# Patient Record
Sex: MALE | Race: BLACK OR AFRICAN AMERICAN | NOT HISPANIC OR LATINO | Employment: UNEMPLOYED | URBAN - METROPOLITAN AREA
[De-identification: names, ages, dates, MRNs, and addresses within clinical notes are randomized per-mention and may not be internally consistent; named-entity substitution may affect disease eponyms.]

---

## 2020-02-12 ENCOUNTER — HISTORICAL (OUTPATIENT)
Dept: LAB | Facility: HOSPITAL | Age: 35
End: 2020-02-12

## 2020-02-12 LAB
ABS NEUT (OLG): 3.1 X10(3)/MCL (ref 1.5–6.9)
ANISOCYTOSIS BLD QL SMEAR: ABNORMAL
BASOPHILS NFR BLD MANUAL: 0 % (ref 0–1)
EOSINOPHIL NFR BLD MANUAL: 2 % (ref 0–5)
ERYTHROCYTE [DISTWIDTH] IN BLOOD BY AUTOMATED COUNT: 15 % (ref 11.5–17)
GRANULOCYTES NFR BLD MANUAL: 41 % (ref 47–80)
HCT VFR BLD AUTO: 34.6 % (ref 42–52)
HGB BLD-MCNC: 12.9 GM/DL (ref 14–18)
LYMPHOCYTES NFR BLD MANUAL: 47 % (ref 15–40)
MACROCYTES BLD QL SMEAR: ABNORMAL
MCH RBC QN AUTO: 49 PG (ref 27–34)
MCHC RBC AUTO-ENTMCNC: 37 GM/DL (ref 31–36)
MCV RBC AUTO: 132 FL (ref 80–99)
MONOCYTES NFR BLD MANUAL: 10 % (ref 4–12)
NRBC BLD MANUAL-RTO: 76 %
PLATELET # BLD AUTO: 376 X10(3)/MCL (ref 140–400)
PLATELET # BLD EST: ADEQUATE 10*3/UL
PMV BLD AUTO: 10.7 FL (ref 6.8–10)
POIKILOCYTOSIS BLD QL SMEAR: ABNORMAL
POLYCHROMASIA BLD QL SMEAR: ABNORMAL
RBC # BLD AUTO: 2.62 X10(6)/MCL (ref 4.7–6.1)
RBC MORPH BLD: ABNORMAL
SCHISTOCYTES BLD QL AUTO: ABNORMAL
WBC # SPEC AUTO: 11.6 X10(3)/MCL (ref 4.5–11.5)

## 2022-04-26 ENCOUNTER — HISTORICAL (OUTPATIENT)
Dept: LAB | Facility: HOSPITAL | Age: 37
End: 2022-04-26
Payer: MEDICARE

## 2022-04-26 LAB
ABS NEUT (OLG): 3 (ref 1.5–6.9)
ALBUMIN SERPL-MCNC: 3.7 G/DL (ref 3.5–5)
ALBUMIN/GLOB SERPL: 0.9 {RATIO} (ref 1.1–2)
ALP SERPL-CCNC: 61 U/L (ref 40–150)
ALT SERPL-CCNC: 31 U/L (ref 0–55)
AST SERPL-CCNC: 41 U/L (ref 5–34)
BILIRUB SERPL-MCNC: 2.1 MG/DL
BILIRUBIN DIRECT+TOT PNL SERPL-MCNC: 0.6 (ref 0–0.5)
BILIRUBIN DIRECT+TOT PNL SERPL-MCNC: 1.5 (ref 0–0.8)
BUN SERPL-MCNC: 14 MG/DL (ref 8.9–20.6)
CALCIUM SERPL-MCNC: 9.7 MG/DL (ref 8.7–10.5)
CHLORIDE SERPL-SCNC: 109 MMOL/L (ref 98–107)
CO2 SERPL-SCNC: 26 MMOL/L (ref 22–29)
CREAT SERPL-MCNC: 0.86 MG/DL (ref 0.73–1.18)
DEPRECATED CALCIDIOL+CALCIFEROL SERPL-MC: 23.8 NG/ML (ref 30–80)
ERYTHROCYTE [DISTWIDTH] IN BLOOD BY AUTOMATED COUNT: 14.5 % (ref 11.5–17)
FERRITIN SERPL-MCNC: 6687.11 NG/ML (ref 21.81–274.66)
GLOBULIN SER-MCNC: 4.3 G/DL (ref 2.4–3.5)
GLUCOSE SERPL-MCNC: 94 MG/DL (ref 74–100)
HCT VFR BLD AUTO: 31.3 % (ref 42–52)
HEMOLYSIS INTERF INDEX SERPL-ACNC: >10
HGB BLD-MCNC: 11.6 G/DL (ref 14–18)
ICTERIC INTERF INDEX SERPL-ACNC: 1
LIPEMIC INTERF INDEX SERPL-ACNC: >10
MANUAL DIFF? (OHS): YES
MCH RBC QN AUTO: 45 PG (ref 27–34)
MCHC RBC AUTO-ENTMCNC: 37 G/DL (ref 31–36)
MCV RBC AUTO: 122 FL (ref 80–99)
PLATELET # BLD AUTO: 335 10*3/UL (ref 140–400)
PMV BLD AUTO: 10 FL (ref 6.8–10)
POTASSIUM SERPL-SCNC: 4.7 MMOL/L (ref 3.5–5.1)
PROT SERPL-MCNC: 8 G/DL (ref 6.4–8.3)
RBC # BLD AUTO: 2.56 10*6/UL (ref 4.7–6.1)
SODIUM SERPL-SCNC: 140 MMOL/L (ref 136–145)
WBC # SPEC AUTO: 9.2 10*3/UL (ref 4.5–11.5)

## 2022-05-10 RX ORDER — OXYCODONE AND ACETAMINOPHEN 10; 325 MG/1; MG/1
1 TABLET ORAL 2 TIMES DAILY
Status: CANCELLED | OUTPATIENT
Start: 2022-05-10

## 2022-05-10 RX ORDER — OXYCODONE AND ACETAMINOPHEN 10; 325 MG/1; MG/1
1 TABLET ORAL 2 TIMES DAILY
Qty: 60 TABLET | Refills: 0 | Status: SHIPPED | OUTPATIENT
Start: 2022-05-10 | End: 2022-06-10

## 2022-05-10 RX ORDER — OXYCODONE AND ACETAMINOPHEN 10; 325 MG/1; MG/1
1 TABLET ORAL 2 TIMES DAILY
COMMUNITY
Start: 2022-03-09 | End: 2022-05-10 | Stop reason: SDUPTHER

## 2022-05-12 ENCOUNTER — DOCUMENTATION ONLY (OUTPATIENT)
Dept: HEMATOLOGY/ONCOLOGY | Facility: CLINIC | Age: 37
End: 2022-05-12
Payer: MEDICARE

## 2022-05-12 NOTE — PROGRESS NOTES
Patient called but no answer and unable to leave a voice message because it wasn't set up yet. Patient was called to see if I still need to set him up with a PCP.

## 2022-05-24 ENCOUNTER — OFFICE VISIT (OUTPATIENT)
Dept: HEMATOLOGY/ONCOLOGY | Facility: CLINIC | Age: 37
End: 2022-05-24
Payer: MEDICARE

## 2022-05-24 DIAGNOSIS — D57.00 SICKLE CELL DISEASE WITH CRISIS: Primary | ICD-10-CM

## 2022-05-24 PROCEDURE — 99442 PR PHYSICIAN TELEPHONE EVALUATION 11-20 MIN: ICD-10-PCS | Mod: 95,,,

## 2022-05-24 PROCEDURE — 99442 PR PHYSICIAN TELEPHONE EVALUATION 11-20 MIN: CPT | Mod: 95,,,

## 2022-05-24 RX ORDER — LOSARTAN POTASSIUM 50 MG/1
50 TABLET ORAL DAILY
COMMUNITY

## 2022-05-24 RX ORDER — FOLIC ACID 1 MG/1
1 TABLET ORAL DAILY
COMMUNITY

## 2022-05-24 RX ORDER — TRAMADOL HYDROCHLORIDE 50 MG/1
50 TABLET ORAL EVERY 12 HOURS PRN
COMMUNITY
End: 2023-05-04

## 2022-05-24 RX ORDER — METOPROLOL SUCCINATE 50 MG/1
50 TABLET, EXTENDED RELEASE ORAL DAILY
COMMUNITY

## 2022-05-24 RX ORDER — LORATADINE 10 MG/1
10 TABLET ORAL DAILY
COMMUNITY

## 2022-05-24 RX ORDER — HYDROXYUREA 500 MG/1
500 CAPSULE ORAL 2 TIMES DAILY
COMMUNITY
End: 2023-01-03

## 2022-05-24 RX ORDER — SPIRONOLACTONE 50 MG/1
50 TABLET, FILM COATED ORAL DAILY
COMMUNITY

## 2022-05-24 RX ORDER — TAMSULOSIN HYDROCHLORIDE 0.4 MG/1
1 CAPSULE ORAL NIGHTLY
COMMUNITY

## 2022-05-24 RX ORDER — DEFERIPRONE 1000 MG/1
2 TABLET ORAL 3 TIMES DAILY
COMMUNITY
End: 2022-09-28 | Stop reason: SDUPTHER

## 2022-05-24 RX ORDER — HYDROCHLOROTHIAZIDE 12.5 MG/1
12.5 CAPSULE ORAL
COMMUNITY
End: 2022-11-02

## 2022-05-24 NOTE — PROGRESS NOTES
Established Patient - Audio Only Telehealth Visit     The patient location is: home  The chief complaint leading to consultation is:   Visit type: Virtual visit with audio only (telephone)  Total time spent with patient: 12 minutes    PCP: Jarett Paulino MD   Ortho: Dr. Mcbride ([place])   Renal: Dr. Lira ([place])        The reason for the audio only service rather than synchronous audio and video virtual visit was related to technical difficulties or patient preference/necessity.     Each patient to whom I provide medical services by telemedicine is:  (1) informed of the relationship between the physician and patient and the respective role of any other health care provider with respect to management of the patient; and (2) notified that they may decline to receive medical services by telemedicine and may withdraw from such care at any time. Patient verbally consented to receive this service via voice-only telephone call.       HPI:    Diagnosis/Treatment History:   Sickle Cell Disease   - Hydrea 500mg po BID   - ECHO 5/6/16   - Ophthalmology 1/31/18   Iron overload 2/2 transfusions   - Rash with exjade   - Ferriprox - started mid 7/2016   Vitamin D Deficiency     Interval History  Mr. Kumar is seen today for follow up for sickle cell disease, visit via telemedicine. Reinforced importance for him to continue compliance with his medications as Ferritin levels have greatly improved.  He verbalized understanding.   No fever, chills, sweats. No chest pain or shortness of breath. No recent hospital, ER admission or blood transfusion.    Radiology results   Rad Results Last 10 Days   MRI of the abdomen without contrast 2/3/2022:   Liver is borderline prominent with right lobe measuring 16.2 cm. Liver remains of low signal intensity on all imaging sequences consistent with hemochromatosis. Slightly higher signal in the out of phase images relative to the in phase sequence, again compatible with hemochromatosis.  The spleen is small and calcified. Pancreas unremarkable. Bilateral renal cysts with dominant 2.9 cm right interpolar cyst. Impression: Liver MRI findings compatible with hemochromatosis. Borderline liver size.     Labs  22- WBC 9.10, Hgb 9.6, .1, Plt 297, Cr 0.93, AST 37, ALT 37, Ferritin 4457    Assessment and plan:       1. Sickle Cell Disease - Cont Hydrea 1000mg BID. Cont folic acid.   EYE EXAM - He did see Dr. Anna Raza, OD in [place] on 2020. Eye exam due - pt states it is scheduled for 21.   Echo done 21, no evidence of pulm htn.   2. Iron overload - Cont Ferriprox 2500mg TID - encouraged better compliance. If improved compliance does not improve ferritin, will need to consider alternate chelator - MRI of liver showed iron deposition 2022. Has not taken Ferripox is some time due to joint pain. His percocet prescription has  due to his PCP in rehab following a CVA. Suggested trial of Jadenu (Child[place] class A) to hopefully build better compliance with iron chelation therapy but he declines at this time.   Labs reviewed Ferritin level improved since he has better daily compliance with Ferriprox. Ferritin decreased from 6687 to 4457  Minimize blood transfusions as able - recommend transfusing 1 unit only for symptomatic anemia and then reassessing prior to giving more blood. In addition to continued risk of iron overload with transfusion, he is at risk for alloimmunization with additional transfusions as well.   3. Vitamin D Deficiency - cont vitamin D. Vit D 23.8   4. Osteopenia - Cont Ca + Vit D. Dr. Mcbride stopped Fosamax.   5. Proteinuria - seeing Dr. Lira regularly   6. Low Back Pain-stable. Dr. Jarett Paulino prescribes narcotics. One time refill of percocet sent today.   7. Left Hip Pain - better with regular exercise. Sees Dr. Mcbride in .   Plan:   Cont Hydrea 1000 mg bid   Cont Ferriprox 2500mg TID     Return in 1 month NP.   CBC, CMP, and  Ferritin q 2 weeks X 1 month   Referral placed to Dr Clemencia Chavarria PCP in [place]  Encouraged to call for any questions or problems   The patient was given ample opportunity to ask questions and they were all answered to satisfaction; patient demonstrated understanding of what we discussed and is agreeable to the plan.                     This service was not originating from a related E/M service provided within the previous 7 days nor will  to an E/M service or procedure within the next 24 hours or my soonest available appointment.  Prevailing standard of care was able to be met in this audio-only visit.

## 2022-06-10 DIAGNOSIS — D57.00 SICKLE CELL DISEASE WITH CRISIS: Primary | ICD-10-CM

## 2022-06-10 RX ORDER — OXYCODONE AND ACETAMINOPHEN 10; 325 MG/1; MG/1
TABLET ORAL
Qty: 60 TABLET | Refills: 0 | Status: SHIPPED | OUTPATIENT
Start: 2022-06-10 | End: 2022-08-01 | Stop reason: SDUPTHER

## 2022-06-14 DIAGNOSIS — D57.00 SICKLE CELL DISEASE WITH CRISIS: ICD-10-CM

## 2022-08-01 DIAGNOSIS — D57.00 SICKLE CELL DISEASE WITH CRISIS: ICD-10-CM

## 2022-08-01 RX ORDER — OXYCODONE AND ACETAMINOPHEN 10; 325 MG/1; MG/1
TABLET ORAL
Qty: 60 TABLET | Refills: 0 | Status: SHIPPED | OUTPATIENT
Start: 2022-08-01 | End: 2022-09-28

## 2022-09-28 DIAGNOSIS — D57.00 SICKLE CELL DISEASE WITH CRISIS: Primary | ICD-10-CM

## 2022-09-28 RX ORDER — DEFERIPRONE 1000 MG/1
2.5 TABLET ORAL 3 TIMES DAILY
Qty: 225 TABLET | Refills: 6 | Status: SHIPPED | OUTPATIENT
Start: 2022-09-28 | End: 2023-05-16 | Stop reason: SDUPTHER

## 2022-10-26 DIAGNOSIS — D57.00 SICKLE CELL DISEASE WITH CRISIS: Primary | ICD-10-CM

## 2022-11-02 ENCOUNTER — OFFICE VISIT (OUTPATIENT)
Dept: HEMATOLOGY/ONCOLOGY | Facility: CLINIC | Age: 37
End: 2022-11-02
Payer: MEDICARE

## 2022-11-02 DIAGNOSIS — D57.00 SICKLE CELL DISEASE WITH CRISIS: Primary | ICD-10-CM

## 2022-11-02 PROCEDURE — 99442 PR PHYSICIAN TELEPHONE EVALUATION 11-20 MIN: ICD-10-PCS | Mod: 95,,,

## 2022-11-02 PROCEDURE — 99442 PR PHYSICIAN TELEPHONE EVALUATION 11-20 MIN: CPT | Mod: 95,,,

## 2022-11-02 RX ORDER — FOLIC ACID 1 MG/1
1 TABLET ORAL DAILY
COMMUNITY
Start: 2022-04-26 | End: 2023-05-26 | Stop reason: SDUPTHER

## 2022-11-02 RX ORDER — HYDROCHLOROTHIAZIDE 12.5 MG/1
1 TABLET ORAL DAILY
COMMUNITY
Start: 2022-08-30

## 2022-11-02 RX ORDER — OXYCODONE HYDROCHLORIDE 10 MG/1
1 TABLET ORAL
COMMUNITY
Start: 2022-06-27 | End: 2023-05-04

## 2022-11-02 RX ORDER — FENTANYL 75 UG/H
1 PATCH TRANSDERMAL DAILY PRN
COMMUNITY
End: 2024-01-08

## 2022-11-02 RX ORDER — VOXELOTOR 500 MG/1
3 TABLET, FILM COATED ORAL DAILY
COMMUNITY
Start: 2022-10-19 | End: 2024-01-08 | Stop reason: SDUPTHER

## 2022-11-02 RX ORDER — OXYCODONE AND ACETAMINOPHEN 10; 325 MG/1; MG/1
1 TABLET ORAL EVERY 6 HOURS PRN
Qty: 60 TABLET | Refills: 0 | Status: SHIPPED | OUTPATIENT
Start: 2022-11-02 | End: 2022-12-02 | Stop reason: SDUPTHER

## 2022-11-02 NOTE — PROGRESS NOTES
Established Patient - Audio Only Telehealth Visit     The patient location is: Home  The chief complaint leading to consultation is:   Visit type: Virtual visit with audio only (telephone)  Total time spent with patient: 15 minutes       The reason for the audio only service rather than synchronous audio and video virtual visit was related to technical difficulties or patient preference/necessity.     Each patient to whom I provide medical services by telemedicine is:  (1) informed of the relationship between the physician and patient and the respective role of any other health care provider with respect to management of the patient; and (2) notified that they may decline to receive medical services by telemedicine and may withdraw from such care at any time. Patient verbally consented to receive this service via voice-only telephone call.       HPI:      PCP: Jarett Paulino MD   Ortho: Dr. Mcbride ([place])   Renal: Dr. Lira ([place])            HPI:    Diagnosis/Treatment History:   Sickle Cell Disease   - Hydrea 500mg po BID   - ECHO 5/6/16   - Ophthalmology 1/31/18   Iron overload 2/2 transfusions   - Rash with exjade   - Ferriprox - started mid 7/2016   Vitamin D Deficiency      Interval History  Mr. Kumar is seen today for follow up for sickle cell disease, visit via telemedicine. Reinforced importance for him to continue compliance with his medications as Ferritin levels have increased.  He verbalized understanding.   No fever, chills, sweats. No chest pain or shortness of breath. No recent hospital, ER admission or blood transfusion.     Radiology results   Rad Results Last 10 Days   MRI of the abdomen without contrast 2/3/2022:   Liver is borderline prominent with right lobe measuring 16.2 cm. Liver remains of low signal intensity on all imaging sequences consistent with hemochromatosis. Slightly higher signal in the out of phase images relative to the in phase sequence, again compatible with  hemochromatosis. The spleen is small and calcified. Pancreas unremarkable. Bilateral renal cysts with dominant 2.9 cm right interpolar cyst. Impression: Liver MRI findings compatible with hemochromatosis. Borderline liver size.      Labs  22- wbc 8.84, hgb 12.7, plt 321, cr 1.05, AST 32, ALT 28 Ferritin 4839  22- WBC 9.10, Hgb 9.6, .1, Plt 297, Cr 0.93, AST 37, ALT 37, Ferritin 4457     Assessment and plan:       1. Sickle Cell Disease - Cont Hydrea 1000mg BID. Cont folic acid.   EYE EXAM - He did see Dr. Anna Raza, OD in [place] on 2020. Eye exam due - pt states it is scheduled for 21.   Echo done 21, no evidence of pulm htn.   2. Iron overload - Cont Ferriprox 2500mg TID - encouraged better compliance. If improved compliance does not improve ferritin, will need to consider alternate chelator - MRI of liver showed iron deposition 2022. Has not taken Ferripox is some time due to joint pain. His percocet prescription has  due to his PCP in rehab following a CVA. Suggested trial of Jadenu (Child-[place] class A) to hopefully build better compliance with iron chelation therapy but he declines at this time.   Labs reviewed Ferritin level improved since he has better daily compliance with Ferriprox. Ferritin decreased from 6687 to 4457  Minimize blood transfusions as able - recommend transfusing 1 unit only for symptomatic anemia and then reassessing prior to giving more blood. In addition to continued risk of iron overload with transfusion, he is at risk for alloimmunization with additional transfusions as well.     3. Vitamin D Deficiency - cont vitamin D. Vit D 23.8   4. Osteopenia - Cont Ca + Vit D. Dr. Mcbride stopped Fosamax.   5. Proteinuria - seeing Dr. Lira regularly   6. Low Back Pain-stable. Dr. Jarett Paulino prescribes narcotics. One time refill of percocet sent today.   7. Left Hip Pain - better with regular exercise. Sees Dr. Mcbride in .     Plan:    Cont Hydrea 1000 mg bid   Cont Ferriprox 1000 mg TID   Return 8 weeks NP.   CBC, CMP, and Ferritin   Encouraged to call for any questions or problems     The patient was given ample opportunity to ask questions and they were all answered to satisfaction; patient demonstrated understanding of what we discussed and is agreeable to the plan.               Kristina Wyatt, MARYP-C                           This service was not originating from a related E/M service provided within the previous 7 days nor will  to an E/M service or procedure within the next 24 hours or my soonest available appointment.  Prevailing standard of care was able to be met in this audio-only visit.

## 2022-12-02 ENCOUNTER — OFFICE VISIT (OUTPATIENT)
Dept: HEMATOLOGY/ONCOLOGY | Facility: CLINIC | Age: 37
End: 2022-12-02
Payer: MEDICARE

## 2022-12-02 DIAGNOSIS — D57.00 SICKLE CELL DISEASE WITH CRISIS: ICD-10-CM

## 2022-12-02 PROCEDURE — 99442 PR PHYSICIAN TELEPHONE EVALUATION 11-20 MIN: CPT | Mod: 95,,, | Performed by: INTERNAL MEDICINE

## 2022-12-02 PROCEDURE — 99442 PR PHYSICIAN TELEPHONE EVALUATION 11-20 MIN: ICD-10-PCS | Mod: 95,,, | Performed by: INTERNAL MEDICINE

## 2022-12-02 RX ORDER — OXYCODONE AND ACETAMINOPHEN 10; 325 MG/1; MG/1
1 TABLET ORAL EVERY 6 HOURS PRN
Qty: 60 TABLET | Refills: 0 | Status: SHIPPED | OUTPATIENT
Start: 2022-12-02 | End: 2023-01-03 | Stop reason: SDUPTHER

## 2022-12-02 NOTE — PROGRESS NOTES
Established Patient - Audio Only Telehealth Visit     The patient location is: Home  The chief complaint leading to consultation is:   Visit type: Virtual visit with audio only (telephone)  Total time spent with patient: 15 minutes       The reason for the audio only service rather than synchronous audio and video virtual visit was related to technical difficulties or patient preference/necessity.     Each patient to whom I provide medical services by telemedicine is:  (1) informed of the relationship between the physician and patient and the respective role of any other health care provider with respect to management of the patient; and (2) notified that they may decline to receive medical services by telemedicine and may withdraw from such care at any time. Patient verbally consented to receive this service via voice-only telephone call.       HPI:      PCP: Jarett Paulino MD   Ortho: Dr. Mcbride ([place])   Renal: Dr. Lira ([place])            HPI:    Diagnosis/Treatment History:   Sickle Cell Disease   - Hydrea 500mg po BID   - Folic acid  - Oxbryta   - ECHO 5/6/16   - Ophthalmology 1/31/18   Iron overload 2/2 transfusions   - Rash with exjade   - Ferriprox - started mid 7/2016   Vitamin D Deficiency      Interval History  Patient is here today for a virtual appointment for pain medication refill.  According to the patient he is compliant with his medication for sickle cell disease including folic acid, hydroxyurea, oxbryta, Ferriprox.  He is on virtual visit for his refill for his pain medications         Radiology results   Rad Results Last 10 Days   MRI of the abdomen without contrast 2/3/2022:   Liver is borderline prominent with right lobe measuring 16.2 cm. Liver remains of low signal intensity on all imaging sequences consistent with hemochromatosis. Slightly higher signal in the out of phase images relative to the in phase sequence, again compatible with hemochromatosis. The spleen is small and  calcified. Pancreas unremarkable. Bilateral renal cysts with dominant 2.9 cm right interpolar cyst. Impression: Liver MRI findings compatible with hemochromatosis. Borderline liver size.      Labs  22- wbc 8.84, hgb 12.7, plt 321, cr 1.05, AST 32, ALT 28 Ferritin 4839  22- WBC 9.10, Hgb 9.6, .1, Plt 297, Cr 0.93, AST 37, ALT 37, Ferritin 4457     Assessment and plan:       1. Sickle Cell Disease - Cont Hydrea 1000mg BID. Cont folic acid.   EYE EXAM - He did see Dr. Anna Raza, OD in [place] on 2020. Eye exam due - pt states it is scheduled for 21.   Echo done 21, no evidence of pulm htn.   2. Iron overload - Cont Ferriprox 2500mg TID - encouraged better compliance. If improved compliance does not improve ferritin, will need to consider alternate chelator - MRI of liver showed iron deposition 2022. Has not taken Ferripox is some time due to joint pain. His percocet prescription has  due to his PCP in rehab following a CVA. Suggested trial of Jadenu (Child-[place] class A) to hopefully build better compliance with iron chelation therapy but he declines at this time.   Labs reviewed Ferritin level improved since he has better daily compliance with Ferriprox. Ferritin decreased from 6687 to 4457  Minimize blood transfusions as able - recommend transfusing 1 unit only for symptomatic anemia and then reassessing prior to giving more blood. In addition to continued risk of iron overload with transfusion, he is at risk for alloimmunization with additional transfusions as well.     3. Vitamin D Deficiency - cont vitamin D. Vit D 23.8   4. Osteopenia - Cont Ca + Vit D. Dr. Mcbride stopped Fosamax.   5. Proteinuria - seeing Dr. Lira regularly   6. Low Back Pain-stable. Dr. Jarett Paulino prescribes narcotics. One time refill of percocet sent today.   7. Left Hip Pain - better with regular exercise. Sees Dr. Mcbride in    8. Palliative care by specialist  - checked   12/02/2022       Plan:   Cont Hydrea 1000 mg bid   Cont Ferriprox 1000 mg TID   Return 4 weeks for follow up   CBC, CMP, and Ferritin   Encouraged to call for any questions or problems     The patient was given ample opportunity to ask questions and they were all answered to satisfaction; patient demonstrated understanding of what we discussed and is agreeable to the plan.        GILBERTO TALLEY MD                           This service was not originating from a related E/M service provided within the previous 7 days nor will  to an E/M service or procedure within the next 24 hours or my soonest available appointment.  Prevailing standard of care was able to be met in this audio-only visit.

## 2023-01-06 ENCOUNTER — CLINICAL SUPPORT (OUTPATIENT)
Dept: HEMATOLOGY/ONCOLOGY | Facility: CLINIC | Age: 38
End: 2023-01-06
Payer: MEDICARE

## 2023-01-06 VITALS
WEIGHT: 193.56 LBS | TEMPERATURE: 98 F | HEIGHT: 66 IN | BODY MASS INDEX: 31.11 KG/M2 | DIASTOLIC BLOOD PRESSURE: 105 MMHG | OXYGEN SATURATION: 96 % | HEART RATE: 61 BPM | RESPIRATION RATE: 18 BRPM | SYSTOLIC BLOOD PRESSURE: 151 MMHG

## 2023-01-06 DIAGNOSIS — E55.9 VITAMIN D DEFICIENCY: ICD-10-CM

## 2023-01-06 DIAGNOSIS — D57.00 SICKLE CELL DISEASE WITH CRISIS: Primary | ICD-10-CM

## 2023-01-06 PROCEDURE — 99214 OFFICE O/P EST MOD 30 MIN: CPT | Mod: S$PBB,,,

## 2023-01-06 PROCEDURE — 99999 PR PBB SHADOW E&M-EST. PATIENT-LVL V: ICD-10-PCS | Mod: PBBFAC,,,

## 2023-01-06 PROCEDURE — 99999 PR PBB SHADOW E&M-EST. PATIENT-LVL V: CPT | Mod: PBBFAC,,,

## 2023-01-06 PROCEDURE — 99215 OFFICE O/P EST HI 40 MIN: CPT | Mod: PBBFAC

## 2023-01-06 PROCEDURE — 99214 PR OFFICE/OUTPT VISIT, EST, LEVL IV, 30-39 MIN: ICD-10-PCS | Mod: S$PBB,,,

## 2023-01-06 RX ORDER — VIT C/E/ZN/COPPR/LUTEIN/ZEAXAN 250MG-90MG
1000 CAPSULE ORAL DAILY
Qty: 30 CAPSULE | Refills: 2 | Status: SHIPPED | OUTPATIENT
Start: 2023-01-06 | End: 2023-04-17

## 2023-01-06 NOTE — PROGRESS NOTES
[facility] Hematology/Oncology  PROGRESS NOTE    Subjective:       Patient ID: Melchor Kumar is a 37 y.o. male.    Diagnosis:  Sickle Cell Disease  Hyperferrtienmia     Current Treatment:  Hydrea 1000mg BID  Ferriprox 2500mg TID  Oxbryta 1500mg daily     Treatment History:  Jadenu (stopped due to rash)  Exjade (stopped due to rash)    Chief Complaint: Sickle Cell Anemia (Pt reports ongoing back pain)    HPI:  Diagnosis/Treatment History:   Sickle Cell Disease   - Hydrea 500mg po BID   - Folic acid  - Oxbryta   - ECHO 5/6/16   - Ophthalmology 1/31/18   Iron overload 2/2 transfusions   - Rash with exjade   - Ferriprox - started mid 7/2016   Vitamin D Deficiency     Interval History:  He returns to the clinic today for a four week follow-up. He reports doing well overall. He continues to have chronic sickle cell pain to his back, that is controlled with his current medication. He reports good compliance with his Hydrea. He does admit to not taking his Ferripox as he was previously instructed to take it due to it making him feel bad. He has been on other iron chelation therapy previously, but was unable to tolerate them. He denies any nausea, vomiting, diarrhea, chest pain, shortness of breath, chest pain, recent infections, or recent illnesses.           PMX/PSHx  Past Medical History:   Diagnosis Date    Sickle-cell disease without crisis       Past Surgical History:   Procedure Laterality Date    CARDIAC CATHETERIZATION      CHOLECYSTECTOMY      ESOPHAGOGASTRODUODENOSCOPY      MEDIPORT INSERTION, SINGLE      TONSILLECTOMY       Allergies:  Review of patient's allergies indicates:   Allergen Reactions    Deferasirox Rash    Povidone-iodine Rash      Social History:   Social History     Tobacco Use    Smoking status: Never    Smokeless tobacco: Never   Substance Use Topics    Alcohol use: Not Currently    Drug use: Not Currently     Types: Marijuana     Medications:  Current Outpatient Medications   Medication  Instructions    cholecalciferol (vitamin D3) (VITAMIN D3) 1,000 Units, Oral, Daily    cyanocobalamin (vitamin B-12) 50 mcg, Oral, Every 7 days    deferiprone (FERRIPROX) 1,000 mg Tab 2.5 tablets, Oral, 3 times daily    fentaNYL (DURAGESIC) 75 mcg/hr 1 patch, Transdermal, Daily PRN    folic acid (FOLVITE) 1 MG tablet 1 tablet, Oral, Daily    folic acid (FOLVITE) 1 mg, Oral, Daily    hydroCHLOROthiazide (HYDRODIURIL) 12.5 MG Tab 1 tablet, Oral, Daily    hydroxyurea (HYDREA) 500 mg Cap TAKE TWO (2) CAPSULES BY MOUTH TWICE DAILY    loratadine (CLARITIN) 10 mg, Oral, Daily    losartan (COZAAR) 50 mg, Oral, Daily    metoprolol succinate (TOPROL-XL) 50 mg, Oral, Daily    OXBRYTA 500 mg Tab 3 applicators, Oral, Daily    oxyCODONE (ROXICODONE) 10 mg Tab immediate release tablet 1 tablet, Oral, Every 4-6 hours PRN    oxyCODONE-acetaminophen (PERCOCET)  mg per tablet 1 tablet, Oral, Every 6 hours PRN    spironolactone (ALDACTONE) 50 mg, Oral, Daily    tamsulosin (FLOMAX) 0.4 mg Cap 1 capsule, Oral, Nightly    traMADoL (ULTRAM) 50 mg, Oral, Every 12 hours PRN     ROS:  Review of Systems   Constitutional:  Negative for appetite change, chills, fatigue, fever and unexpected weight change.   HENT:  Negative for ear discharge, facial swelling, mouth sores, nosebleeds, sinus pressure/congestion and sore throat.    Eyes:  Negative for pain and visual disturbance.   Respiratory:  Negative for cough, chest tightness, shortness of breath and wheezing.    Cardiovascular:  Negative for chest pain, palpitations and leg swelling.   Gastrointestinal:  Negative for abdominal pain, blood in stool, change in bowel habit, constipation, diarrhea, nausea, vomiting and change in bowel habit.   Endocrine: Negative.    Genitourinary:  Negative for dysuria, frequency, hematuria and urgency.   Musculoskeletal:  Negative for arthralgias, gait problem, joint swelling and myalgias.        Back pain, chronic   Integumentary:  Negative for color  change, pallor, rash and wound.   Allergic/Immunologic: Negative.  Negative for frequent infections.   Neurological:  Negative for dizziness, vertigo, syncope, weakness and numbness.   Hematological:  Negative for adenopathy. Does not bruise/bleed easily.   Psychiatric/Behavioral:  Negative for confusion and dysphoric mood. The patient is not nervous/anxious.    All other systems reviewed and are negative.    Objective:   Vitals:  Vitals:    01/06/23 0914   BP: (!) 151/105   Pulse: 61   Resp: 18   Temp: 98.2 °F (36.8 °C)      Wt Readings from Last 3 Encounters:   01/06/23 0914 87.8 kg (193 lb 9 oz)      Physical Examination:  Physical Exam  Vitals reviewed.   Constitutional:       General: He is not in acute distress.     Appearance: Normal appearance. He is normal weight.   HENT:      Head: Normocephalic and atraumatic.      Nose: Nose normal.      Mouth/Throat:      Mouth: Mucous membranes are moist.      Pharynx: Oropharynx is clear. No oropharyngeal exudate or posterior oropharyngeal erythema.   Eyes:      Extraocular Movements: Extraocular movements intact.      Conjunctiva/sclera: Conjunctivae normal.      Pupils: Pupils are equal, round, and reactive to light.   Cardiovascular:      Rate and Rhythm: Normal rate and regular rhythm.      Pulses: Normal pulses.      Heart sounds: No murmur heard.    No friction rub. No gallop.   Pulmonary:      Effort: Pulmonary effort is normal. No respiratory distress.      Breath sounds: No wheezing, rhonchi or rales.   Abdominal:      General: Abdomen is flat. Bowel sounds are normal.      Palpations: Abdomen is soft. There is no mass.      Tenderness: There is no abdominal tenderness. There is no guarding.   Musculoskeletal:         General: No swelling, tenderness or deformity. Normal range of motion.      Cervical back: Normal range of motion and neck supple.      Right lower leg: No edema.      Left lower leg: No edema.   Lymphadenopathy:      Cervical: No cervical  adenopathy.   Skin:     General: Skin is warm and dry.      Findings: No erythema, lesion or rash.   Neurological:      General: No focal deficit present.      Mental Status: He is alert and oriented to person, place, and time. Mental status is at baseline.      Cranial Nerves: No cranial nerve deficit.      Gait: Gait normal.   Psychiatric:         Mood and Affect: Mood normal.         Behavior: Behavior normal.         Thought Content: Thought content normal.         Judgment: Judgment normal.     ECOG SCORE    0 - Fully active-able to carry on all pre-disease performance without restriction       Labs:  Lab Visit on 01/06/2023   Component Date Value    Sodium Level 01/06/2023 140     Potassium Level 01/06/2023 4.1     Chloride 01/06/2023 109 (H)     Carbon Dioxide 01/06/2023 25     Glucose Level 01/06/2023 88     Blood Urea Nitrogen 01/06/2023 13.0     Creatinine 01/06/2023 0.97     Calcium Level Total 01/06/2023 9.1     Protein Total 01/06/2023 6.8     Albumin Level 01/06/2023 3.3 (L)     Globulin 01/06/2023 3.5     Albumin/Globulin Ratio 01/06/2023 0.9 (L)     Bilirubin Total 01/06/2023 1.7 (H)     Alkaline Phosphatase 01/06/2023 67     Alanine Aminotransferase 01/06/2023 30     Aspartate Aminotransfera* 01/06/2023 37 (H)     eGFR 01/06/2023 >90     Ferritin Level 01/06/2023 6,637.78 (H)     WBC 01/06/2023 7.6     RBC 01/06/2023 2.53 (L)     Hgb 01/06/2023 11.7 (L)     Hct 01/06/2023 31.2 (L)     MCV 01/06/2023 123.3 (H)     MCH 01/06/2023 46.2     MCHC 01/06/2023 37.5 (H)     RDW 01/06/2023 16.6 (H)     Platelet 01/06/2023 296     MPV 01/06/2023 10.4     Neut % 01/06/2023 36.9     Lymph % 01/06/2023 45.6     Mono % 01/06/2023 12.2     Eos % 01/06/2023 3.7     Basophil % 01/06/2023 1.3     Lymph # 01/06/2023 3.44     Neut # 01/06/2023 2.79     Mono # 01/06/2023 0.92     Eos # 01/06/2023 0.28     Baso # 01/06/2023 0.10     IG# 01/06/2023 0.02     IG% 01/06/2023 0.3     RBC Morph 01/06/2023 Abnormal (A)      Anisocyte 01/06/2023 1+ (A)     Elliptocytosis 01/06/2023 Slight (A)     Macrocyte 01/06/2023 Slight (A)     Poik 01/06/2023 1+ (A)     Polychrom 01/06/2023 1+ (A)     Platelet Est 01/06/2023 Normal      11/1/22- wbc 8.84, hgb 12.7, plt 321, cr 1.05, AST 32, ALT 28 Ferritin 4839  5/16/22- WBC 9.10, Hgb 9.6, .1, Plt 297, Cr 0.93, AST 37, ALT 37, Ferritin 4457     Pathology:      Radiology/Diagnostics:    MRI of the abdomen without contrast 2/3/2022:   Liver is borderline prominent with right lobe measuring 16.2 cm. Liver remains of low signal intensity on all imaging sequences consistent with hemochromatosis. Slightly higher signal in the out of phase images relative to the in phase sequence, again compatible with hemochromatosis. The spleen is small and calcified. Pancreas unremarkable. Bilateral renal cysts with dominant 2.9 cm right interpolar cyst. Impression: Liver MRI findings compatible with hemochromatosis. Borderline liver size.     I have reviewed all available lab results and radiology reports.  Assessment:   Sickle Cell Disease   Cont Hydrea 1000mg BID. Cont folic acid.   EYE EXAM - He did see Dr. Anna Raza, OD in [place] on Jan 2020. Eye exam due - pt states it is scheduled for 12/21/21.   Echo done 2/24/21, no evidence of pulm htn.   Iron overload   Cont Ferriprox 2500mg TID - encouraged better compliance. If improved compliance does not improve ferritin, will need to consider alternate chelator - MRI of liver showed iron deposition 2/2022. He verbalized understanding and that he would begin taking the medication as prescribed.   Ferritin worsened from 4457 to 6637.  Minimize blood transfusions as able    recommend transfusing 1 unit only for symptomatic anemia and then reassessing prior to giving more blood. In addition to continued risk of iron overload with transfusion, he is at risk for alloimmunization with additional transfusions as well.   Vitamin D Deficiency   Has not been  taking vitamin d due to the medication not being refilled. Will send refill today and check level at next visit.  Osteopenia   Cont Ca + Vit D.   Dr. Mcbride stopped Fosamax.   Proteinuria   seeing Dr. Lira regularly   Next scheduled follow up with Nephrology 01/10/2023   Low Back Pain  stable. Dr. Jarett Paulino prescribes narcotics   Left Hip Pain   better with regular exercise.  Followed by orthopedics, Dr. Mcbride regularly.   Palliative care by specialist      Plan:   Cont Hydrea 1000 mg bid   Cont Ferriprox 2500 mg TID  Vitamin-D sent to pharmacy.  Return 4 weeks for follow up   CBC, CMP, vitamin-D, and Ferritin   Encouraged to call for any questions or problems       Providers:  PCP: Jarett Paulino MD   Ortho: Dr. Mcbride ([place])   Renal: Dr. Lira ([place])                I have explained all of the above in detail and the patient understands all of the current recommendation(s). I have answered all of their questions to the best of my ability and to their complete satisfaction.       Jenna Sarmiento, MARYP-C  Oncology/Hematology   [facility]

## 2023-01-09 ENCOUNTER — DOCUMENTATION ONLY (OUTPATIENT)
Dept: HEMATOLOGY/ONCOLOGY | Facility: CLINIC | Age: 38
End: 2023-01-09
Payer: MEDICARE

## 2023-02-03 ENCOUNTER — CLINICAL SUPPORT (OUTPATIENT)
Dept: HEMATOLOGY/ONCOLOGY | Facility: CLINIC | Age: 38
End: 2023-02-03
Payer: MEDICARE

## 2023-02-03 DIAGNOSIS — Z79.64 ENCOUNTER FOR MONITORING OF HYDROXYUREA THERAPY: ICD-10-CM

## 2023-02-03 DIAGNOSIS — D57.00 SICKLE CELL DISEASE WITH CRISIS: Primary | ICD-10-CM

## 2023-02-03 DIAGNOSIS — Z79.64 ON HYDROXYUREA THERAPY: ICD-10-CM

## 2023-02-03 DIAGNOSIS — E83.19 IRON OVERLOAD: ICD-10-CM

## 2023-02-03 DIAGNOSIS — Z51.81 ENCOUNTER FOR MONITORING OF HYDROXYUREA THERAPY: ICD-10-CM

## 2023-02-03 DIAGNOSIS — E55.9 VITAMIN D DEFICIENCY: ICD-10-CM

## 2023-02-03 PROCEDURE — 99214 OFFICE O/P EST MOD 30 MIN: CPT | Mod: 95,,,

## 2023-02-03 PROCEDURE — 99214 PR OFFICE/OUTPT VISIT, EST, LEVL IV, 30-39 MIN: ICD-10-PCS | Mod: 95,,,

## 2023-02-03 RX ORDER — AMLODIPINE BESYLATE 10 MG/1
10 TABLET ORAL DAILY
COMMUNITY
Start: 2023-01-12

## 2023-02-03 NOTE — PROGRESS NOTES
[facility] Hematology/Oncology  PROGRESS NOTE    Subjective:       Patient ID: Melchor Kumar is a 37 y.o. male.    Diagnosis:  Sickle Cell Disease  Hyperferrtienmia     Current Treatment:  Hydrea 1000mg BID  Ferriprox 2500mg TID  Oxbryta 1500mg daily     Treatment History:  Jadenu (stopped due to rash)  Exjade (stopped due to rash)    Chief Complaint: Sickle Cell Anemia (Pt reports some back pain that comes and goes)    HPI:  Diagnosis/Treatment History:   Sickle Cell Disease   - Hydrea 500mg po BID   - Folic acid  - Oxbryta   - ECHO 5/6/16   - Ophthalmology 1/31/18   Iron overload 2/2 transfusions   - Rash with exjade   - Ferriprox - started mid 7/2016   Vitamin D Deficiency     Interval History:  He returns to the clinic today for a four week follow-up. He reports doing well overall. He continues to have chronic sickle cell pain to his back, that is controlled with his current medication. He reports good compliance with his Hydrea. He has also been taking his Ferripox TID as instructed.  Did not complete labs for this visit, but will complete them soon. He denies any nausea, vomiting, diarrhea, chest pain, shortness of breath, chest pain, recent infections, or recent illnesses.       This is a telemedicine note.  Patient was treated using telemedicine, real-time audio according to [facility] protocols.  Jenna ABEL distant provider, conducted the visit from the location identified below.  The patient participated in the visit at a non-[facility] location select about the patient (or patient's representative), identified below.  I am license in the state where the patient stated they were located.  The patient (or patient's representative) stated that they understood and accepted the privacy and security wrist to the information at their location.  Patient was located in [place].  Jenna ABEL distant provider, was located in [facility].     PMX/PSHx  Past Medical History:   Diagnosis Date    Sickle-cell  disease without crisis       Past Surgical History:   Procedure Laterality Date    CARDIAC CATHETERIZATION      CHOLECYSTECTOMY      ESOPHAGOGASTRODUODENOSCOPY      MEDIPORT INSERTION, SINGLE      TONSILLECTOMY       Allergies:  Review of patient's allergies indicates:   Allergen Reactions    Deferasirox Rash    Povidone-iodine Rash      Social History:   Social History     Tobacco Use    Smoking status: Never    Smokeless tobacco: Never   Substance Use Topics    Alcohol use: Not Currently    Drug use: Not Currently     Types: Marijuana     Medications:  Current Outpatient Medications   Medication Instructions    amLODIPine (NORVASC) 10 mg, Oral, Daily    cholecalciferol (vitamin D3) (VITAMIN D3) 1,000 Units, Oral, Daily    cyanocobalamin (vitamin B-12) 50 mcg, Oral, Every 7 days    deferiprone (FERRIPROX) 1,000 mg Tab 2.5 tablets, Oral, 3 times daily    fentaNYL (DURAGESIC) 75 mcg/hr 1 patch, Transdermal, Daily PRN    folic acid (FOLVITE) 1 MG tablet 1 tablet, Oral, Daily    folic acid (FOLVITE) 1 mg, Oral, Daily    hydroCHLOROthiazide (HYDRODIURIL) 12.5 MG Tab 1 tablet, Oral, Daily    hydroxyurea (HYDREA) 500 mg Cap TAKE TWO (2) CAPSULES BY MOUTH TWICE DAILY    loratadine (CLARITIN) 10 mg, Oral, Daily    losartan (COZAAR) 50 mg, Oral, Daily    metoprolol succinate (TOPROL-XL) 50 mg, Oral, Daily    OXBRYTA 500 mg Tab 3 applicators, Oral, Daily    oxyCODONE (ROXICODONE) 10 mg Tab immediate release tablet 1 tablet, Oral, Every 4-6 hours PRN    oxyCODONE-acetaminophen (PERCOCET)  mg per tablet 1 tablet, Oral, Every 6 hours PRN    spironolactone (ALDACTONE) 50 mg, Oral, Daily    tamsulosin (FLOMAX) 0.4 mg Cap 1 capsule, Oral, Nightly    traMADoL (ULTRAM) 50 mg, Oral, Every 12 hours PRN     ROS:  Review of Systems   Constitutional:  Negative for appetite change, chills, fatigue, fever and unexpected weight change.   HENT:  Negative for ear discharge, facial swelling, mouth sores, nosebleeds, sinus  pressure/congestion and sore throat.    Eyes:  Negative for pain and visual disturbance.   Respiratory:  Negative for cough, chest tightness, shortness of breath and wheezing.    Cardiovascular:  Negative for chest pain, palpitations and leg swelling.   Gastrointestinal:  Negative for abdominal pain, blood in stool, change in bowel habit, constipation, diarrhea, nausea, vomiting and change in bowel habit.   Endocrine: Negative.    Genitourinary:  Negative for dysuria, frequency, hematuria and urgency.   Musculoskeletal:  Positive for back pain. Negative for arthralgias, gait problem, joint swelling and myalgias.        Back pain, chronic   Integumentary:  Negative for color change, pallor, rash and wound.   Allergic/Immunologic: Negative.  Negative for frequent infections.   Neurological:  Negative for dizziness, vertigo, syncope, weakness, numbness and headaches.   Hematological:  Negative for adenopathy. Does not bruise/bleed easily.   Psychiatric/Behavioral:  Negative for confusion and dysphoric mood. The patient is not nervous/anxious.    All other systems reviewed and are negative.    Objective:   Vitals:  There were no vitals filed for this visit.     Wt Readings from Last 3 Encounters:   01/06/23 0914 87.8 kg (193 lb 9 oz)      Physical Examination:  Unable to perform due to TeleMed      ECOG SCORE    0 - Fully active-able to carry on all pre-disease performance without restriction       Labs:  No visits with results within 1 Week(s) from this visit.   Latest known visit with results is:   Lab Visit on 01/06/2023   Component Date Value    Sodium Level 01/06/2023 140     Potassium Level 01/06/2023 4.1     Chloride 01/06/2023 109 (H)     Carbon Dioxide 01/06/2023 25     Glucose Level 01/06/2023 88     Blood Urea Nitrogen 01/06/2023 13.0     Creatinine 01/06/2023 0.97     Calcium Level Total 01/06/2023 9.1     Protein Total 01/06/2023 6.8     Albumin Level 01/06/2023 3.3 (L)     Globulin 01/06/2023 3.5      Albumin/Globulin Ratio 01/06/2023 0.9 (L)     Bilirubin Total 01/06/2023 1.7 (H)     Alkaline Phosphatase 01/06/2023 67     Alanine Aminotransferase 01/06/2023 30     Aspartate Aminotransfera* 01/06/2023 37 (H)     eGFR 01/06/2023 >90     Ferritin Level 01/06/2023 6,637.78 (H)     WBC 01/06/2023 7.6     RBC 01/06/2023 2.53 (L)     Hgb 01/06/2023 11.7 (L)     Hct 01/06/2023 31.2 (L)     MCV 01/06/2023 123.3 (H)     MCH 01/06/2023 46.2     MCHC 01/06/2023 37.5 (H)     RDW 01/06/2023 16.6 (H)     Platelet 01/06/2023 296     MPV 01/06/2023 10.4     Neut % 01/06/2023 36.9     Lymph % 01/06/2023 45.6     Mono % 01/06/2023 12.2     Eos % 01/06/2023 3.7     Basophil % 01/06/2023 1.3     Lymph # 01/06/2023 3.44     Neut # 01/06/2023 2.79     Mono # 01/06/2023 0.92     Eos # 01/06/2023 0.28     Baso # 01/06/2023 0.10     IG# 01/06/2023 0.02     IG% 01/06/2023 0.3     RBC Morph 01/06/2023 Abnormal (A)     Anisocyte 01/06/2023 1+ (A)     Elliptocytosis 01/06/2023 Slight (A)     Macrocyte 01/06/2023 Slight (A)     Poik 01/06/2023 1+ (A)     Polychrom 01/06/2023 1+ (A)     Platelet Est 01/06/2023 Normal      11/1/22- wbc 8.84, hgb 12.7, plt 321, cr 1.05, AST 32, ALT 28 Ferritin 4839  5/16/22- WBC 9.10, Hgb 9.6, .1, Plt 297, Cr 0.93, AST 37, ALT 37, Ferritin 4457     Pathology:      Radiology/Diagnostics:    MRI of the abdomen without contrast 2/3/2022:   Liver is borderline prominent with right lobe measuring 16.2 cm. Liver remains of low signal intensity on all imaging sequences consistent with hemochromatosis. Slightly higher signal in the out of phase images relative to the in phase sequence, again compatible with hemochromatosis. The spleen is small and calcified. Pancreas unremarkable. Bilateral renal cysts with dominant 2.9 cm right interpolar cyst. Impression: Liver MRI findings compatible with hemochromatosis. Borderline liver size.     I have reviewed all available lab results and radiology reports.  Assessment:    Sickle Cell Disease   Cont Hydrea 1000mg BID. Cont folic acid.   EYE EXAM - He did see Dr. Anna Raza, OD in [place] on Jan 2020. Eye exam due - he states he will schedule appointment.  Echo done 2/24/21, no evidence of pulm htn.   Iron overload   Cont Ferriprox 2500mg TID - encouraged better compliance. If improved compliance does not improve ferritin, will need to consider alternate chelator - MRI of liver showed iron deposition 2/2022.  Ferritin worsened from 4457 to 6637 previously.  He did not complete labs as ordered for this visit.  I will update him when his labs are completed.  Minimize blood transfusions as able    recommend transfusing 1 unit only for symptomatic anemia and then reassessing prior to giving more blood. In addition to continued risk of iron overload with transfusion, he is at risk for alloimmunization with additional transfusions as well.   Vitamin D Deficiency   I will check with next lab draw.  He is currently taking vitamin-D as prescribed.  Osteopenia   Cont Ca + Vit D.   Dr. Mcbride stopped Fosamax.   Proteinuria   seeing Dr. Lira regularly   Continue follow-ups with Nephrology.   Low Back Pain  stable. Dr. Jarett Paulino prescribes narcotics   Left Hip Pain   better with regular exercise.  Followed by orthopedics, Dr. Mcbride regularly.        Plan:   He will complete labs as previously ordered.  I will call him with the results when completed.  Cont Hydrea 1000 mg bid   Cont Ferriprox 2500 mg TID  Continue vitamin-D supplement.  Return 4 weeks for follow up   CBC, CMP, vitamin-D, Ferritin   Encouraged to call for any questions or problems       Providers:  PCP: Jarett Paulino MD   Ortho: Dr. Mcbride ([place])   Renal: Dr. Lira ([place])                I have explained all of the above in detail and the patient understands all of the current recommendation(s). I have answered all of their questions to the best of my ability and to their complete satisfaction.        Jenna Sarmiento, FNP-C  Oncology/Hematology   [facility]

## 2023-03-03 ENCOUNTER — OFFICE VISIT (OUTPATIENT)
Dept: HEMATOLOGY/ONCOLOGY | Facility: CLINIC | Age: 38
End: 2023-03-03
Payer: MEDICARE

## 2023-03-03 DIAGNOSIS — D57.00 SICKLE CELL DISEASE WITH CRISIS: ICD-10-CM

## 2023-03-03 DIAGNOSIS — E55.9 VITAMIN D DEFICIENCY, UNSPECIFIED: ICD-10-CM

## 2023-03-03 DIAGNOSIS — D57.00 SICKLE CELL DISEASE WITH CRISIS: Primary | ICD-10-CM

## 2023-03-03 PROCEDURE — 99215 OFFICE O/P EST HI 40 MIN: CPT | Mod: 95,,, | Performed by: NURSE PRACTITIONER

## 2023-03-03 PROCEDURE — 99215 PR OFFICE/OUTPT VISIT, EST, LEVL V, 40-54 MIN: ICD-10-PCS | Mod: 95,,, | Performed by: NURSE PRACTITIONER

## 2023-03-03 RX ORDER — OXYCODONE AND ACETAMINOPHEN 10; 325 MG/1; MG/1
TABLET ORAL
Qty: 90 TABLET | Refills: 0 | Status: SHIPPED | OUTPATIENT
Start: 2023-03-03 | End: 2023-05-04 | Stop reason: SDUPTHER

## 2023-03-07 NOTE — PROGRESS NOTES
[facility] Hematology/Oncology  PROGRESS NOTE    Subjective:       Patient ID: Melchor Kumar is a 37 y.o. male.    Diagnosis:  Sickle Cell Disease  Hyperferrtienmia     Current Treatment:  Hydrea 1000mg BID  Ferriprox 2500mg TID  Oxbryta 1500mg daily     Treatment History:  Jadenu (stopped due to rash)  Exjade (stopped due to rash)    Chief Complaint: Sickle Cell Anemia (No complaints.)    HPI:  Diagnosis/Treatment History:   Sickle Cell Disease   - Hydrea 500mg po BID   - Folic acid  - Oxbryta   - ECHO 5/6/16   - Ophthalmology 1/31/18   Iron overload 2/2 transfusions   - Rash with exjade   - Ferriprox - started mid 7/2016   Vitamin D Deficiency     Interval History:  Mr. Kumar presents for follow-up via video. He reports doing well overall. He continues to have chronic sickle cell pain to his back, that is controlled with Percocet. He is having challenges obtaining his pain medication refill from the pharmacy. His last hospitalization was April 2022 with pneumonia.       He reports good compliance with his Hydrea. He has also been taking his Ferripox TID as instructed.  He denies any nausea, vomiting, diarrhea, chest pain, shortness of breath, chest pain, recent infections, or recent illnesses.       PMX/PSHx  Past Medical History:   Diagnosis Date    Sickle-cell disease without crisis       Past Surgical History:   Procedure Laterality Date    CARDIAC CATHETERIZATION      CHOLECYSTECTOMY      ESOPHAGOGASTRODUODENOSCOPY      MEDIPORT INSERTION, SINGLE      TONSILLECTOMY       Allergies:  Review of patient's allergies indicates:   Allergen Reactions    Deferasirox Rash    Povidone-iodine Rash      Social History:   Social History     Tobacco Use    Smoking status: Never    Smokeless tobacco: Never   Substance Use Topics    Alcohol use: Not Currently    Drug use: Not Currently     Types: Marijuana     Medications:  Current Outpatient Medications   Medication Instructions    amLODIPine (NORVASC) 10 mg, Oral, Daily     cholecalciferol (vitamin D3) (VITAMIN D3) 1,000 Units, Oral, Daily    cyanocobalamin (vitamin B-12) 50 mcg, Oral, Every 7 days    deferiprone (FERRIPROX) 1,000 mg Tab 2.5 tablets, Oral, 3 times daily    fentaNYL (DURAGESIC) 75 mcg/hr 1 patch, Transdermal, Daily PRN    folic acid (FOLVITE) 1 MG tablet 1 tablet, Oral, Daily    folic acid (FOLVITE) 1 mg, Oral, Daily    hydroCHLOROthiazide (HYDRODIURIL) 12.5 MG Tab 1 tablet, Oral, Daily    hydroxyurea (HYDREA) 500 mg Cap TAKE TWO (2) CAPSULES BY MOUTH TWICE DAILY    loratadine (CLARITIN) 10 mg, Oral, Daily    losartan (COZAAR) 50 mg, Oral, Daily    metoprolol succinate (TOPROL-XL) 50 mg, Oral, Daily    OXBRYTA 500 mg Tab 3 applicators, Oral, Daily    oxyCODONE (ROXICODONE) 10 mg Tab immediate release tablet 1 tablet, Oral, Every 4-6 hours PRN    oxyCODONE-acetaminophen (PERCOCET)  mg per tablet TAKE ONE (1) TABLET BY MOUTH EVERY SIX (6) (SIX) HOURS AS NEEDED FOR PAIN.    spironolactone (ALDACTONE) 50 mg, Oral, Daily    tamsulosin (FLOMAX) 0.4 mg Cap 1 capsule, Oral, Nightly    traMADoL (ULTRAM) 50 mg, Oral, Every 12 hours PRN     ROS:  Review of Systems   Constitutional:  Negative for appetite change, chills, fatigue, fever and unexpected weight change.   HENT:  Negative for ear discharge, facial swelling, mouth sores, nosebleeds, sinus pressure/congestion and sore throat.    Eyes:  Negative for pain and visual disturbance.   Respiratory:  Negative for cough, chest tightness, shortness of breath and wheezing.    Cardiovascular:  Negative for chest pain, palpitations and leg swelling.   Gastrointestinal:  Negative for abdominal pain, blood in stool, change in bowel habit, constipation, diarrhea, nausea, vomiting and change in bowel habit.   Endocrine: Negative.    Genitourinary:  Negative for dysuria, frequency, hematuria and urgency.   Musculoskeletal:  Positive for back pain. Negative for arthralgias, gait problem, joint swelling and myalgias.        Back pain,  chronic   Integumentary:  Negative for color change, pallor, rash and wound.   Allergic/Immunologic: Negative.  Negative for frequent infections.   Neurological:  Negative for dizziness, vertigo, syncope, weakness, numbness and headaches.   Hematological:  Negative for adenopathy. Does not bruise/bleed easily.   Psychiatric/Behavioral:  Negative for confusion and dysphoric mood. The patient is not nervous/anxious.    All other systems reviewed and are negative.    Objective:   Vitals:  There were no vitals filed for this visit.     Wt Readings from Last 3 Encounters:   01/06/23 0914 87.8 kg (193 lb 9 oz)      Physical Examination:  Unable to perform due to TeleMed      ECOG SCORE           Labs:  No visits with results within 1 Week(s) from this visit.   Latest known visit with results is:   Lab Visit on 01/06/2023   Component Date Value    Sodium Level 01/06/2023 140     Potassium Level 01/06/2023 4.1     Chloride 01/06/2023 109 (H)     Carbon Dioxide 01/06/2023 25     Glucose Level 01/06/2023 88     Blood Urea Nitrogen 01/06/2023 13.0     Creatinine 01/06/2023 0.97     Calcium Level Total 01/06/2023 9.1     Protein Total 01/06/2023 6.8     Albumin Level 01/06/2023 3.3 (L)     Globulin 01/06/2023 3.5     Albumin/Globulin Ratio 01/06/2023 0.9 (L)     Bilirubin Total 01/06/2023 1.7 (H)     Alkaline Phosphatase 01/06/2023 67     Alanine Aminotransferase 01/06/2023 30     Aspartate Aminotransfera* 01/06/2023 37 (H)     eGFR 01/06/2023 >90     Ferritin Level 01/06/2023 6,637.78 (H)     WBC 01/06/2023 7.6     RBC 01/06/2023 2.53 (L)     Hgb 01/06/2023 11.7 (L)     Hct 01/06/2023 31.2 (L)     MCV 01/06/2023 123.3 (H)     MCH 01/06/2023 46.2     MCHC 01/06/2023 37.5 (H)     RDW 01/06/2023 16.6 (H)     Platelet 01/06/2023 296     MPV 01/06/2023 10.4     Neut % 01/06/2023 36.9     Lymph % 01/06/2023 45.6     Mono % 01/06/2023 12.2     Eos % 01/06/2023 3.7     Basophil % 01/06/2023 1.3     Lymph # 01/06/2023 3.44     Neut #  01/06/2023 2.79     Mono # 01/06/2023 0.92     Eos # 01/06/2023 0.28     Baso # 01/06/2023 0.10     IG# 01/06/2023 0.02     IG% 01/06/2023 0.3     RBC Morph 01/06/2023 Abnormal (A)     Anisocyte 01/06/2023 1+ (A)     Elliptocytosis 01/06/2023 Slight (A)     Macrocyte 01/06/2023 Slight (A)     Poik 01/06/2023 1+ (A)     Polychrom 01/06/2023 1+ (A)     Platelet Est 01/06/2023 Normal      11/1/22- wbc 8.84, hgb 12.7, plt 321, cr 1.05, AST 32, ALT 28 Ferritin 4839  5/16/22- WBC 9.10, Hgb 9.6, .1, Plt 297, Cr 0.93, AST 37, ALT 37, Ferritin 4457     Pathology:      Radiology/Diagnostics:    MRI of the abdomen without contrast 2/3/2022:   Liver is borderline prominent with right lobe measuring 16.2 cm. Liver remains of low signal intensity on all imaging sequences consistent with hemochromatosis. Slightly higher signal in the out of phase images relative to the in phase sequence, again compatible with hemochromatosis. The spleen is small and calcified. Pancreas unremarkable. Bilateral renal cysts with dominant 2.9 cm right interpolar cyst. Impression: Liver MRI findings compatible with hemochromatosis. Borderline liver size.     I have reviewed all available lab results and radiology reports.  Assessment:   Sickle Cell Disease   Cont Hydrea 1000mg BID. Cont folic acid.   EYE EXAM - He did see Dr. Raza, OD in [place] last week.   Echo done 2/24/21, no evidence of pulm htn.   Iron overload   Cont Ferriprox 2500mg TID - encouraged better compliance. If improved compliance does not improve ferritin, will need to consider alternate chelator - MRI of liver showed iron deposition 2/2022.  Ferritin improved to 4313.    recommend transfusing 1 unit only for symptomatic anemia and then reassessing prior to giving more blood. In addition to continued risk of iron overload with transfusion, he is at risk for alloimmunization with additional transfusions as well.   Vitamin D Deficiency   Continue taking vitamin-D as  prescribed, vitamin D low normal.  Osteopenia   Cont Ca + Vit D.   Dr. Mcbride stopped Fosamax.   Proteinuria   seeing Dr. Lira regularly   Continue follow-ups with Nephrology.   Low Back Pain  stable. Dr. Jarett Paulino prescribes narcotics   Left Hip Pain   better with regular exercise.  Followed by orthopedics, Dr. Mcbride regularly.        Plan:   Sickle Cell Anemia  Cont Hydrea 1000 mg bid   Cont Ferriprox 2500 mg TID  Continue vitamin-D supplement.  Return 4 weeks for follow up   CBC, CMP, vitamin-D, Ferritin   Encouraged to call for any questions or problems       Providers:  PCP: Jarett Paulino MD   Ortho: Dr. Mcbride ([place])   Renal: Dr. Lira ([place])

## 2023-05-04 DIAGNOSIS — D57.00 SICKLE CELL DISEASE WITH CRISIS: ICD-10-CM

## 2023-05-04 RX ORDER — OXYCODONE AND ACETAMINOPHEN 10; 325 MG/1; MG/1
TABLET ORAL
Qty: 90 TABLET | Refills: 0 | Status: SHIPPED | OUTPATIENT
Start: 2023-05-04 | End: 2023-07-05

## 2023-05-16 DIAGNOSIS — D57.00 SICKLE CELL DISEASE WITH CRISIS: ICD-10-CM

## 2023-05-16 RX ORDER — DEFERIPRONE 1000 MG/1
2.5 TABLET ORAL 3 TIMES DAILY
Qty: 225 TABLET | Refills: 6 | Status: SHIPPED | OUTPATIENT
Start: 2023-05-16 | End: 2023-12-22 | Stop reason: SDUPTHER

## 2023-05-26 ENCOUNTER — OFFICE VISIT (OUTPATIENT)
Dept: HEMATOLOGY/ONCOLOGY | Facility: CLINIC | Age: 38
End: 2023-05-26
Payer: MEDICARE

## 2023-05-26 DIAGNOSIS — Z79.64 ENCOUNTER FOR MONITORING OF HYDROXYUREA THERAPY: ICD-10-CM

## 2023-05-26 DIAGNOSIS — Z51.81 ENCOUNTER FOR MONITORING OF HYDROXYUREA THERAPY: ICD-10-CM

## 2023-05-26 DIAGNOSIS — E83.19 IRON OVERLOAD: ICD-10-CM

## 2023-05-26 DIAGNOSIS — D57.00 SICKLE CELL DISEASE WITH CRISIS: Primary | ICD-10-CM

## 2023-05-26 DIAGNOSIS — E55.9 VITAMIN D DEFICIENCY, UNSPECIFIED: ICD-10-CM

## 2023-05-26 DIAGNOSIS — Z79.64 ON HYDROXYUREA THERAPY: ICD-10-CM

## 2023-05-26 DIAGNOSIS — E55.9 VITAMIN D DEFICIENCY: ICD-10-CM

## 2023-05-26 PROCEDURE — 99214 PR OFFICE/OUTPT VISIT, EST, LEVL IV, 30-39 MIN: ICD-10-PCS | Mod: 95,,,

## 2023-05-26 PROCEDURE — 99214 OFFICE O/P EST MOD 30 MIN: CPT | Mod: 95,,,

## 2023-05-26 RX ORDER — ERGOCALCIFEROL 1.25 MG/1
50000 CAPSULE ORAL
Qty: 30 CAPSULE | Refills: 2 | Status: SHIPPED | OUTPATIENT
Start: 2023-05-26 | End: 2024-05-25

## 2023-05-26 RX ORDER — CYCLOBENZAPRINE HCL 10 MG
1 TABLET ORAL DAILY
COMMUNITY

## 2023-05-26 NOTE — PROGRESS NOTES
This is a telemedicine note.  Patient was treated using telemedicine, real-time audio according to [facility] protocols.  Jenna ABEL, distant provider, conducted the visit from the location identified below.  The patient participated in the visit at a non-[facility] location select about the patient (or patient's representative), identified below.  I am license in the state where the patient stated they were located.  The patient (or patient's representative) stated that they understood and accepted the privacy and security wrist to the information at their location.  Patient was located in [place] .  Jenna ABEL, distant provider, was located in [facility].     [facility] Hematology/Oncology  PROGRESS NOTE    Subjective:       Patient ID: Melchor Kumar is a 37 y.o. male.    Diagnosis:  Sickle Cell Disease  Hyperferrtienmia     Current Treatment:  Hydrea 1000mg BID  Ferriprox 2500mg TID  Oxbryta 1500mg daily     Treatment History:  Jadenu (stopped due to rash)  Exjade (stopped due to rash)    Chief Complaint: Sickle Cell Anemia (Pt reports lower back pain x2 weeks)    HPI:  Diagnosis/Treatment History:   Sickle Cell Disease   - Hydrea 500mg po BID   - Folic acid  - Oxbryta   - ECHO 5/6/16   - Ophthalmology 1/31/18   Iron overload 2/2 transfusions   - Rash with exjade   - Ferriprox - started mid 7/2016   Vitamin D Deficiency     Interval History:  He returns to the clinic today via TeleMed for a follow up regarding his history of sickle cell disease.  He reports that he was recently admitted to the hospital for a COVID infection.  He had severe pain and trouble walking after discharge.  He is currently wearing a back brace.  He is no longer able to work.  He is no longer receiving physical therapy.  He is followed by ortho regularly.  He was instructed to contact them for a possible extension of his his physical therapy.  Continues to take his Hydrea, Ferriprox, and oxbryta as prescribed without any  complications or concerns.  He denies any shortness of breath, chest pain, nausea confirmed, diarrhea, constipation, easy bruising or bleeding.      PMX/PSHx  Past Medical History:   Diagnosis Date    Sickle-cell disease without crisis       Past Surgical History:   Procedure Laterality Date    CARDIAC CATHETERIZATION      CHOLECYSTECTOMY      ESOPHAGOGASTRODUODENOSCOPY      MEDIPORT INSERTION, SINGLE      TONSILLECTOMY       Allergies:  Review of patient's allergies indicates:   Allergen Reactions    Deferasirox Rash    Povidone-iodine Rash      Social History:   Social History     Tobacco Use    Smoking status: Never    Smokeless tobacco: Never   Substance Use Topics    Alcohol use: Not Currently    Drug use: Not Currently     Types: Marijuana     Medications:  Current Outpatient Medications   Medication Instructions    amLODIPine (NORVASC) 10 mg, Oral, Daily    cyanocobalamin (vitamin B-12) 50 mcg, Oral, Every 7 days    cyclobenzaprine (FLEXERIL) 10 MG tablet 1 tablet, Oral, Daily    deferiprone (FERRIPROX) 1,000 mg Tab 2.5 tablets, Oral, 3 times daily    empagliflozin (JARDIANCE) 10 mg, Oral, Daily    fentaNYL (DURAGESIC) 75 mcg/hr 1 patch, Transdermal, Daily PRN    folic acid (FOLVITE) 1 MG tablet 1 tablet, Oral, Daily    folic acid (FOLVITE) 1 mg, Oral, Daily    hydroCHLOROthiazide (HYDRODIURIL) 12.5 MG Tab 1 tablet, Oral, Daily    hydroxyurea (HYDREA) 500 mg Cap TAKE TWO (2) CAPSULES BY MOUTH TWICE DAILY    loratadine (CLARITIN) 10 mg, Oral, Daily    losartan (COZAAR) 50 mg, Oral, Daily    metoprolol succinate (TOPROL-XL) 50 mg, Oral, Daily    OXBRYTA 500 mg Tab 3 applicators, Oral, Daily    oxyCODONE-acetaminophen (PERCOCET)  mg per tablet TAKE ONE (1) TABLET BY MOUTH EVERY SIX (6) (SIX) HOURS AS NEEDED FOR PAIN.    spironolactone (ALDACTONE) 50 mg, Oral, Daily    tamsulosin (FLOMAX) 0.4 mg Cap 1 capsule, Oral, Nightly    vitamin D (VITAMIN D3) 1000 units Tab TAKE ONE (1) TABLET (1,000 UNITS TOTAL) BY  MOUTH ONCE DAILY.     ROS:  Review of Systems   Constitutional:  Negative for appetite change, chills, fatigue, fever and unexpected weight change.   HENT:  Negative for ear discharge, facial swelling, mouth sores, nosebleeds, sinus pressure/congestion and sore throat.    Eyes:  Negative for pain and visual disturbance.   Respiratory:  Negative for cough, chest tightness, shortness of breath and wheezing.    Cardiovascular:  Negative for chest pain, palpitations and leg swelling.   Gastrointestinal:  Negative for abdominal pain, blood in stool, change in bowel habit, constipation, diarrhea, nausea, vomiting and change in bowel habit.   Endocrine: Negative.    Genitourinary:  Negative for dysuria, frequency, hematuria and urgency.   Musculoskeletal:  Positive for back pain and gait problem. Negative for arthralgias, joint swelling and myalgias.        Back pain, chronic   Integumentary:  Negative for color change, pallor, rash and wound.   Allergic/Immunologic: Negative.  Negative for frequent infections.   Neurological:  Positive for weakness. Negative for dizziness, vertigo, syncope, numbness and headaches.   Hematological:  Negative for adenopathy. Does not bruise/bleed easily.   Psychiatric/Behavioral:  Negative for confusion and dysphoric mood. The patient is not nervous/anxious.    All other systems reviewed and are negative.    Objective:   Vitals:  There were no vitals filed for this visit.     Wt Readings from Last 3 Encounters:   01/06/23 0914 87.8 kg (193 lb 9 oz)      Physical Examination:  Physical Exam  Constitutional:       Appearance: Normal appearance. He is normal weight.   HENT:      Head: Normocephalic and atraumatic.   Neurological:      General: No focal deficit present.      Mental Status: He is alert and oriented to person, place, and time. Mental status is at baseline.   Psychiatric:         Mood and Affect: Mood normal.         Behavior: Behavior normal.         Thought Content: Thought  content normal.          ECOG SCORE    2 - Capable of all selfcare but unable to carry out any work activities, active > 50% of hours       Labs:  No visits with results within 1 Week(s) from this visit.   Latest known visit with results is:   Lab Visit on 01/06/2023   Component Date Value    Sodium Level 01/06/2023 140     Potassium Level 01/06/2023 4.1     Chloride 01/06/2023 109 (H)     Carbon Dioxide 01/06/2023 25     Glucose Level 01/06/2023 88     Blood Urea Nitrogen 01/06/2023 13.0     Creatinine 01/06/2023 0.97     Calcium Level Total 01/06/2023 9.1     Protein Total 01/06/2023 6.8     Albumin Level 01/06/2023 3.3 (L)     Globulin 01/06/2023 3.5     Albumin/Globulin Ratio 01/06/2023 0.9 (L)     Bilirubin Total 01/06/2023 1.7 (H)     Alkaline Phosphatase 01/06/2023 67     Alanine Aminotransferase 01/06/2023 30     Aspartate Aminotransfera* 01/06/2023 37 (H)     eGFR 01/06/2023 >90     Ferritin Level 01/06/2023 6,637.78 (H)     WBC 01/06/2023 7.6     RBC 01/06/2023 2.53 (L)     Hgb 01/06/2023 11.7 (L)     Hct 01/06/2023 31.2 (L)     MCV 01/06/2023 123.3 (H)     MCH 01/06/2023 46.2     MCHC 01/06/2023 37.5 (H)     RDW 01/06/2023 16.6 (H)     Platelet 01/06/2023 296     MPV 01/06/2023 10.4     Neut % 01/06/2023 36.9     Lymph % 01/06/2023 45.6     Mono % 01/06/2023 12.2     Eos % 01/06/2023 3.7     Basophil % 01/06/2023 1.3     Lymph # 01/06/2023 3.44     Neut # 01/06/2023 2.79     Mono # 01/06/2023 0.92     Eos # 01/06/2023 0.28     Baso # 01/06/2023 0.10     IG# 01/06/2023 0.02     IG% 01/06/2023 0.3     RBC Morph 01/06/2023 Abnormal (A)     Anisocyte 01/06/2023 1+ (A)     Elliptocytosis 01/06/2023 Slight (A)     Macrocyte 01/06/2023 Slight (A)     Poik 01/06/2023 1+ (A)     Polychrom 01/06/2023 1+ (A)     Platelet Est 01/06/2023 Normal      11/1/22- wbc 8.84, hgb 12.7, plt 321, cr 1.05, AST 32, ALT 28 Ferritin 4839  5/16/22- WBC 9.10, Hgb 9.6, .1, Plt 297, Cr 0.93, AST 37, ALT 37, Ferritin 4457      Pathology:      Radiology/Diagnostics:    MRI of the abdomen without contrast 2/3/2022:   Liver is borderline prominent with right lobe measuring 16.2 cm. Liver remains of low signal intensity on all imaging sequences consistent with hemochromatosis. Slightly higher signal in the out of phase images relative to the in phase sequence, again compatible with hemochromatosis. The spleen is small and calcified. Pancreas unremarkable. Bilateral renal cysts with dominant 2.9 cm right interpolar cyst. Impression: Liver MRI findings compatible with hemochromatosis. Borderline liver size.     I have reviewed all available lab results and radiology reports.  Assessment:   Sickle Cell Disease   Cont Hydrea 1000mg BID, Ferriprox 2500 mg 3 times daily, oxbryta 1500 mg daily. Cont folic acid.   EYE EXAM - He did see Dr. Raza, OD in [place] recently   Echo done 2/24/21, no evidence of pulm htn.   Iron overload   Cont Ferriprox 2500mg TID - encouraged better compliance. If improved compliance does not improve ferritin, will need to consider alternate chelator - MRI of liver showed iron deposition 2/2022.   recommend transfusing 1 unit only for symptomatic anemia and then reassessing prior to giving more blood. In addition to continued risk of iron overload with transfusion, he is at risk for alloimmunization with additional transfusions as well.   Ferritin currently pending.  We will call with results.  Vitamin D Deficiency   Vitamin-D still low with daily supplement.  We will call in new supplement to pharmacy.  Osteopenia   Cont Ca + Vit D.   Dr. Mcbride stopped Fosamax.   Proteinuria   seeing Dr. Lira regularly   Continue follow-ups with Nephrology.   Low Back Pain  Worsened after COVID infection.  He was instructed to contact orthopedic for more physical therapy    Dr. Jarett Paulino prescribes narcotics   Left Hip Pain   better with regular exercise.  Followed by orthopedics, Dr. Mcbride regularly.        Plan:    Labs stable.  Cont Hydrea 1000 mg bid   Cont Ferriprox 2500 mg TID  Vitamin-D increased to 83811 units weekly.  Prescription sent to pharmacy.  Return 4 weeks for follow up with NP via TeleMed  CBC, CMP, vitamin-D, Ferritin   Encouraged to call for any questions or problems       Providers:  PCP: Jarett Paulino MD   Ortho: Dr. Mcbride ([place])   Renal: Dr. Lira ([place])             JUAN Reyes-PRETTY  Oncology/Hematology   [facility]

## 2023-06-06 DIAGNOSIS — D57.00 SICKLE CELL DISEASE WITH CRISIS: ICD-10-CM

## 2023-06-09 RX ORDER — HYDROXYUREA 500 MG/1
CAPSULE ORAL
Qty: 120 CAPSULE | Refills: 3 | Status: SHIPPED | OUTPATIENT
Start: 2023-06-09 | End: 2024-01-08 | Stop reason: SDUPTHER

## 2023-07-03 DIAGNOSIS — D57.00 SICKLE CELL DISEASE WITH CRISIS: ICD-10-CM

## 2023-07-05 RX ORDER — OXYCODONE AND ACETAMINOPHEN 10; 325 MG/1; MG/1
TABLET ORAL
Qty: 90 TABLET | Refills: 0 | Status: SHIPPED | OUTPATIENT
Start: 2023-07-05 | End: 2023-09-05

## 2023-07-07 ENCOUNTER — PATIENT MESSAGE (OUTPATIENT)
Dept: HEMATOLOGY/ONCOLOGY | Facility: CLINIC | Age: 38
End: 2023-07-07

## 2023-07-07 ENCOUNTER — OFFICE VISIT (OUTPATIENT)
Dept: HEMATOLOGY/ONCOLOGY | Facility: CLINIC | Age: 38
End: 2023-07-07
Payer: MEDICARE

## 2023-07-07 DIAGNOSIS — D57.00 SICKLE CELL DISEASE WITH CRISIS: Primary | ICD-10-CM

## 2023-07-07 PROCEDURE — 99214 PR OFFICE/OUTPT VISIT, EST, LEVL IV, 30-39 MIN: ICD-10-PCS | Mod: 95,,, | Performed by: NURSE PRACTITIONER

## 2023-07-07 PROCEDURE — 99214 OFFICE O/P EST MOD 30 MIN: CPT | Mod: 95,,, | Performed by: NURSE PRACTITIONER

## 2023-07-09 NOTE — PROGRESS NOTES
[facility] Hematology/Oncology  PROGRESS NOTE    Subjective:       Patient ID: Melchor Kumar is a 37 y.o. male.    Diagnosis:  Sickle Cell Disease  Hyperferrtienmia     Current Treatment:  Hydrea 1000mg BID  Ferriprox 2500mg TID  Oxbryta 1500mg daily     Treatment History:  Jadenu (stopped due to rash)  Exjade (stopped due to rash)    Chief Complaint: Sickle Cell Anemia (Pt reports no new issues or concerns today)    HPI:  Diagnosis/Treatment History:   Sickle Cell Disease   - Hydrea 500mg po BID   - Folic acid  - Oxbryta   - ECHO 5/6/16   - Ophthalmology 1/31/18   Iron overload 2/2 transfusions   - Rash with exjade   - Ferriprox - started mid 7/2016   Vitamin D Deficiency     Interval History:  He returns to the clinic today via TeleMed for a follow up regarding his history of sickle cell disease. He admits since his COVID infection, he has noted  pain and trouble walking after discharge which is alleviated by oxycodone.  He is followed by ortho regularly.  He continues to take his Hydrea, Ferriprox, and oxbryta as prescribed without any complications or concerns.  He denies any shortness of breath, chest pain, nausea confirmed, diarrhea, constipation, easy bruising or bleeding.      PMX/PSHx  Past Medical History:   Diagnosis Date    Sickle-cell disease without crisis       Past Surgical History:   Procedure Laterality Date    CARDIAC CATHETERIZATION      CHOLECYSTECTOMY      ESOPHAGOGASTRODUODENOSCOPY      MEDIPORT INSERTION, SINGLE      TONSILLECTOMY       Allergies:  Review of patient's allergies indicates:   Allergen Reactions    Deferasirox Rash    Povidone-iodine Rash      Social History:   Social History     Tobacco Use    Smoking status: Never    Smokeless tobacco: Never   Substance Use Topics    Alcohol use: Not Currently    Drug use: Not Currently     Types: Marijuana     Medications:  Current Outpatient Medications   Medication Instructions    amLODIPine (NORVASC) 10 mg, Oral, Daily     cyanocobalamin (vitamin B-12) 50 mcg, Oral, Every 7 days    cyclobenzaprine (FLEXERIL) 10 MG tablet 1 tablet, Oral, Daily    deferiprone (FERRIPROX) 1,000 mg Tab 2.5 tablets, Oral, 3 times daily    empagliflozin (JARDIANCE) 10 mg, Oral, Daily    ergocalciferol (VITAMIN D2) 50,000 Units, Oral, Every 7 days    fentaNYL (DURAGESIC) 75 mcg/hr 1 patch, Transdermal, Daily PRN    folic acid (FOLVITE) 1 mg, Oral, Daily    hydroCHLOROthiazide (HYDRODIURIL) 12.5 MG Tab 1 tablet, Oral, Daily    hydroxyurea (HYDREA) 500 mg Cap TAKE TWO (2) CAPSULES BY MOUTH TWICE DAILY    loratadine (CLARITIN) 10 mg, Oral, Daily    losartan (COZAAR) 50 mg, Oral, Daily    metoprolol succinate (TOPROL-XL) 50 mg, Oral, Daily    OXBRYTA 500 mg Tab 3 applicators, Oral, Daily    oxyCODONE-acetaminophen (PERCOCET)  mg per tablet TAKE ONE (1) TABLET BY MOUTH EVERY SIX (6) (SIX) HOURS AS NEEDED FOR PAIN.    spironolactone (ALDACTONE) 50 mg, Oral, Daily    tamsulosin (FLOMAX) 0.4 mg Cap 1 capsule, Oral, Nightly     ROS:  Review of Systems   Constitutional:  Negative for appetite change, chills, fatigue, fever and unexpected weight change.   HENT:  Negative for ear discharge, facial swelling, mouth sores, nosebleeds, sinus pressure/congestion and sore throat.    Eyes:  Negative for pain and visual disturbance.   Respiratory:  Negative for cough, chest tightness, shortness of breath and wheezing.    Cardiovascular:  Negative for chest pain, palpitations and leg swelling.   Gastrointestinal:  Negative for abdominal pain, blood in stool, change in bowel habit, constipation, diarrhea, nausea, vomiting and change in bowel habit.   Endocrine: Negative.    Genitourinary:  Negative for dysuria, frequency, hematuria and urgency.   Musculoskeletal:  Positive for back pain and gait problem. Negative for arthralgias, joint swelling and myalgias.        Back pain, chronic   Integumentary:  Negative for color change, pallor, rash and wound.    Allergic/Immunologic: Negative.  Negative for frequent infections.   Neurological:  Positive for weakness. Negative for dizziness, vertigo, syncope, numbness and headaches.   Hematological:  Negative for adenopathy. Does not bruise/bleed easily.   Psychiatric/Behavioral:  Negative for confusion and dysphoric mood. The patient is not nervous/anxious.    All other systems reviewed and are negative.    Objective:   Vitals:  There were no vitals filed for this visit.     Wt Readings from Last 3 Encounters:   01/06/23 0914 87.8 kg (193 lb 9 oz)       Labs:  No visits with results within 1 Week(s) from this visit.   Latest known visit with results is:   Lab Visit on 01/06/2023   Component Date Value    Sodium Level 01/06/2023 140     Potassium Level 01/06/2023 4.1     Chloride 01/06/2023 109 (H)     Carbon Dioxide 01/06/2023 25     Glucose Level 01/06/2023 88     Blood Urea Nitrogen 01/06/2023 13.0     Creatinine 01/06/2023 0.97     Calcium Level Total 01/06/2023 9.1     Protein Total 01/06/2023 6.8     Albumin Level 01/06/2023 3.3 (L)     Globulin 01/06/2023 3.5     Albumin/Globulin Ratio 01/06/2023 0.9 (L)     Bilirubin Total 01/06/2023 1.7 (H)     Alkaline Phosphatase 01/06/2023 67     Alanine Aminotransferase 01/06/2023 30     Aspartate Aminotransfera* 01/06/2023 37 (H)     eGFR 01/06/2023 >90     Ferritin Level 01/06/2023 6,637.78 (H)     WBC 01/06/2023 7.6     RBC 01/06/2023 2.53 (L)     Hgb 01/06/2023 11.7 (L)     Hct 01/06/2023 31.2 (L)     MCV 01/06/2023 123.3 (H)     MCH 01/06/2023 46.2     MCHC 01/06/2023 37.5 (H)     RDW 01/06/2023 16.6 (H)     Platelet 01/06/2023 296     MPV 01/06/2023 10.4     Neut % 01/06/2023 36.9     Lymph % 01/06/2023 45.6     Mono % 01/06/2023 12.2     Eos % 01/06/2023 3.7     Basophil % 01/06/2023 1.3     Lymph # 01/06/2023 3.44     Neut # 01/06/2023 2.79     Mono # 01/06/2023 0.92     Eos # 01/06/2023 0.28     Baso # 01/06/2023 0.10     IG# 01/06/2023 0.02     IG% 01/06/2023 0.3      RBC Morph 01/06/2023 Abnormal (A)     Anisocytosis 01/06/2023 1+ (A)     Elliptocytosis 01/06/2023 Slight (A)     Macrocytosis 01/06/2023 Slight (A)     Poikilocytosis 01/06/2023 1+ (A)     Polychromasia 01/06/2023 1+ (A)     Platelets 01/06/2023 Normal      11/1/22- wbc 8.84, hgb 12.7, plt 321, cr 1.05, AST 32, ALT 28 Ferritin 4839  5/16/22- WBC 9.10, Hgb 9.6, .1, Plt 297, Cr 0.93, AST 37, ALT 37, Ferritin 4457     Pathology:      Radiology/Diagnostics:    MRI of the abdomen without contrast 2/3/2022:   Liver is borderline prominent with right lobe measuring 16.2 cm. Liver remains of low signal intensity on all imaging sequences consistent with hemochromatosis. Slightly higher signal in the out of phase images relative to the in phase sequence, again compatible with hemochromatosis. The spleen is small and calcified. Pancreas unremarkable. Bilateral renal cysts with dominant 2.9 cm right interpolar cyst. Impression: Liver MRI findings compatible with hemochromatosis. Borderline liver size.     I have reviewed all available lab results and radiology reports.  Assessment:   Sickle Cell Disease   Cont Hydrea 1000mg BID, Ferriprox 2500 mg 3 times daily, oxbryta 1500 mg daily. Cont folic acid.   EYE EXAM - He did see Dr. Raza, OD in [place] recently   Echo done 2/24/21, no evidence of pulm htn.   Iron overload   Cont Ferriprox 2500mg TID - encouraged better compliance. If improved compliance does not improve ferritin, will need to consider alternate chelator - MRI of liver showed iron deposition 2/2022.   recommend transfusing 1 unit only for symptomatic anemia and then reassessing prior to giving more blood. In addition to continued risk of iron overload with transfusion, he is at risk for alloimmunization with additional transfusions as well.   Ferritin improved  Vitamin D Deficiency   Vitamin-D improving   Osteopenia   Cont Ca + Vit D.   Dr. Mcbride stopped Fosamax.   Proteinuria   seeing   Pankaj regularly   Continue follow-ups with Nephrology.   Low Back Pain  Worsened after COVID infection.  He was instructed to contact orthopedic for more physical therapy    We have been refilling Oxycodone monthly    Left Hip Pain   better with regular exercise.  Followed by orthopedics, Dr. Mcbride regularly.        Plan:   Labs stable.  Cont Hydrea 1000 mg bid   Cont Ferriprox 2500 mg TID  Vitamin-D Continue 71936 units weekly.    Return 4 weeks for follow up with NP via TeleMed  CBC, CMP, vitamin-D, Ferritin   Encouraged to call for any questions or problems       Providers:  PCP: Jarett Paulino MD   Ortho: Dr. Mcbride ([place])   Renal: Dr. Lira ([place])

## 2023-09-05 DIAGNOSIS — D57.00 SICKLE CELL DISEASE WITH CRISIS: ICD-10-CM

## 2023-09-05 RX ORDER — OXYCODONE AND ACETAMINOPHEN 10; 325 MG/1; MG/1
TABLET ORAL
Qty: 90 TABLET | Refills: 0 | Status: SHIPPED | OUTPATIENT
Start: 2023-09-05 | End: 2023-11-03

## 2023-11-02 DIAGNOSIS — D57.00 SICKLE CELL DISEASE WITH CRISIS: ICD-10-CM

## 2023-11-03 RX ORDER — OXYCODONE AND ACETAMINOPHEN 10; 325 MG/1; MG/1
TABLET ORAL
Qty: 90 TABLET | Refills: 0 | Status: SHIPPED | OUTPATIENT
Start: 2023-11-03 | End: 2024-01-08 | Stop reason: SDUPTHER

## 2023-12-22 DIAGNOSIS — D57.00 SICKLE CELL DISEASE WITH CRISIS: ICD-10-CM

## 2023-12-22 RX ORDER — DEFERIPRONE 1000 MG/1
2.5 TABLET ORAL 3 TIMES DAILY
Qty: 225 TABLET | Refills: 6 | Status: SHIPPED | OUTPATIENT
Start: 2023-12-22 | End: 2024-01-18 | Stop reason: SDUPTHER

## 2023-12-27 DIAGNOSIS — D57.00 SICKLE CELL DISEASE WITH CRISIS: ICD-10-CM

## 2023-12-27 RX ORDER — DEFERIPRONE 1000 MG/1
2.5 TABLET, COATED ORAL 3 TIMES DAILY
Qty: 225 TABLET | Refills: 6 | Status: CANCELLED | OUTPATIENT
Start: 2023-12-27

## 2024-01-08 ENCOUNTER — OFFICE VISIT (OUTPATIENT)
Dept: HEMATOLOGY/ONCOLOGY | Facility: CLINIC | Age: 39
End: 2024-01-08
Payer: MEDICARE

## 2024-01-08 ENCOUNTER — INFUSION (OUTPATIENT)
Dept: INFUSION THERAPY | Facility: HOSPITAL | Age: 39
End: 2024-01-08
Payer: MEDICARE

## 2024-01-08 VITALS
TEMPERATURE: 99 F | DIASTOLIC BLOOD PRESSURE: 86 MMHG | SYSTOLIC BLOOD PRESSURE: 128 MMHG | WEIGHT: 185 LBS | RESPIRATION RATE: 18 BRPM | DIASTOLIC BLOOD PRESSURE: 86 MMHG | TEMPERATURE: 99 F | OXYGEN SATURATION: 95 % | HEIGHT: 66 IN | BODY MASS INDEX: 29.73 KG/M2 | WEIGHT: 185 LBS | HEART RATE: 98 BPM | SYSTOLIC BLOOD PRESSURE: 128 MMHG | BODY MASS INDEX: 29.73 KG/M2 | HEART RATE: 98 BPM | OXYGEN SATURATION: 95 % | RESPIRATION RATE: 18 BRPM | HEIGHT: 66 IN

## 2024-01-08 DIAGNOSIS — E55.9 VITAMIN D DEFICIENCY: ICD-10-CM

## 2024-01-08 DIAGNOSIS — E83.19 IRON OVERLOAD: ICD-10-CM

## 2024-01-08 DIAGNOSIS — Z51.81 ENCOUNTER FOR MONITORING OF HYDROXYUREA THERAPY: ICD-10-CM

## 2024-01-08 DIAGNOSIS — Z79.64 ON HYDROXYUREA THERAPY: ICD-10-CM

## 2024-01-08 DIAGNOSIS — D57.1 SICKLE CELL DISEASE WITHOUT CRISIS: Primary | ICD-10-CM

## 2024-01-08 DIAGNOSIS — D57.00 SICKLE CELL DISEASE WITH CRISIS: Primary | ICD-10-CM

## 2024-01-08 DIAGNOSIS — E55.9 VITAMIN D DEFICIENCY, UNSPECIFIED: ICD-10-CM

## 2024-01-08 DIAGNOSIS — Z79.64 ENCOUNTER FOR MONITORING OF HYDROXYUREA THERAPY: ICD-10-CM

## 2024-01-08 PROCEDURE — 25000003 PHARM REV CODE 250

## 2024-01-08 PROCEDURE — S5010 5% DEXTROSE AND 0.45% SALINE: HCPCS

## 2024-01-08 PROCEDURE — 96375 TX/PRO/DX INJ NEW DRUG ADDON: CPT

## 2024-01-08 PROCEDURE — 96374 THER/PROPH/DIAG INJ IV PUSH: CPT

## 2024-01-08 PROCEDURE — 96360 HYDRATION IV INFUSION INIT: CPT

## 2024-01-08 PROCEDURE — 99999 PR PBB SHADOW E&M-EST. PATIENT-LVL IV: CPT | Mod: PBBFAC,,,

## 2024-01-08 PROCEDURE — 96361 HYDRATE IV INFUSION ADD-ON: CPT

## 2024-01-08 PROCEDURE — 99215 OFFICE O/P EST HI 40 MIN: CPT | Mod: S$PBB,,,

## 2024-01-08 PROCEDURE — 99214 OFFICE O/P EST MOD 30 MIN: CPT | Mod: PBBFAC,25

## 2024-01-08 PROCEDURE — 63600175 PHARM REV CODE 636 W HCPCS

## 2024-01-08 RX ORDER — HYDROMORPHONE HYDROCHLORIDE 2 MG/ML
0.5 INJECTION, SOLUTION INTRAMUSCULAR; INTRAVENOUS; SUBCUTANEOUS
Status: COMPLETED | OUTPATIENT
Start: 2024-01-08 | End: 2024-01-08

## 2024-01-08 RX ORDER — GABAPENTIN 300 MG/1
CAPSULE ORAL
COMMUNITY
Start: 2023-12-13

## 2024-01-08 RX ORDER — SODIUM CHLORIDE 0.9 % (FLUSH) 0.9 %
10 SYRINGE (ML) INJECTION
Status: DISCONTINUED | OUTPATIENT
Start: 2024-01-08 | End: 2024-01-08 | Stop reason: HOSPADM

## 2024-01-08 RX ORDER — HEPARIN 100 UNIT/ML
500 SYRINGE INTRAVENOUS
Status: CANCELLED | OUTPATIENT
Start: 2024-01-08

## 2024-01-08 RX ORDER — SODIUM CHLORIDE 0.9 % (FLUSH) 0.9 %
10 SYRINGE (ML) INJECTION
Status: CANCELLED | OUTPATIENT
Start: 2024-01-08

## 2024-01-08 RX ORDER — HEPARIN 100 UNIT/ML
500 SYRINGE INTRAVENOUS
Status: DISCONTINUED | OUTPATIENT
Start: 2024-01-08 | End: 2024-01-08 | Stop reason: HOSPADM

## 2024-01-08 RX ORDER — IBUPROFEN 800 MG/1
TABLET ORAL
COMMUNITY
Start: 2023-10-23

## 2024-01-08 RX ORDER — HYDROMORPHONE HYDROCHLORIDE 2 MG/ML
0.5 INJECTION, SOLUTION INTRAMUSCULAR; INTRAVENOUS; SUBCUTANEOUS EVERY 6 HOURS PRN
Status: CANCELLED
Start: 2024-01-08

## 2024-01-08 RX ORDER — HYDROXYUREA 500 MG/1
CAPSULE ORAL
Qty: 120 CAPSULE | Refills: 3 | Status: SHIPPED | OUTPATIENT
Start: 2024-01-08

## 2024-01-08 RX ORDER — OXYCODONE AND ACETAMINOPHEN 10; 325 MG/1; MG/1
1 TABLET ORAL EVERY 6 HOURS PRN
Qty: 90 TABLET | Refills: 0 | Status: SHIPPED | OUTPATIENT
Start: 2024-01-08

## 2024-01-08 RX ORDER — VOXELOTOR 500 MG/1
1500 TABLET, FILM COATED ORAL DAILY
Qty: 90 TABLET | Refills: 3 | Status: SHIPPED | OUTPATIENT
Start: 2024-01-08

## 2024-01-08 RX ADMIN — DEXTROSE AND SODIUM CHLORIDE 500 ML/HR: 5; 450 INJECTION, SOLUTION INTRAVENOUS at 02:01

## 2024-01-08 RX ADMIN — HYDROMORPHONE HYDROCHLORIDE 0.5 MG: 2 INJECTION, SOLUTION INTRAMUSCULAR; INTRAVENOUS; SUBCUTANEOUS at 02:01

## 2024-01-08 NOTE — PROGRESS NOTES
[facility] Hematology/Oncology  PROGRESS NOTE    Subjective:       Patient ID: Melchor Kumar is a 38 y.o. male.    Diagnosis:  Sickle Cell Disease  Hyperferrtienmia     Current Treatment:  Hydrea 1000mg BID  Ferriprox 2500mg TID  Oxbryta 1500mg daily     Treatment History:  Jadenu (stopped due to rash)  Exjade (stopped due to rash)    Chief Complaint: Sickle Cell Anemia (Pt test positive for Flu on Monday 1/1/2024. Pt c/o weakness, joint and back pain. )    HPI:  Diagnosis/Treatment History:   Sickle Cell Disease   - Hydrea 500mg po BID   - Folic acid  - Oxbryta   - ECHO 5/6/16   - Ophthalmology 1/31/18   Iron overload 2/2 transfusions   - Rash with exjade   - Ferriprox - started mid 7/2016   Vitamin D Deficiency     Interval History:  He returns to the clinic today for a follow up regarding his history of sickle cell disease. He was recently diagnosed with the flu and completed his Tamiflu today. He reports being tired and fatigued since getting the flu. He also has worsened pain today He is followed by ortho regularly.  He continues to take his Hydrea, Ferriprox, and oxbryta as prescribed without any complications or concerns.  He denies any shortness of breath, chest pain, nausea confirmed, diarrhea, constipation, easy bruising or bleeding.      PMX/PSHx  Past Medical History:   Diagnosis Date    Sickle-cell disease without crisis       Past Surgical History:   Procedure Laterality Date    CARDIAC CATHETERIZATION      CHOLECYSTECTOMY      ESOPHAGOGASTRODUODENOSCOPY      MEDIPORT INSERTION, SINGLE      TONSILLECTOMY       Allergies:  Review of patient's allergies indicates:   Allergen Reactions    Deferasirox Rash    Povidone-iodine Rash      Social History:   Social History     Tobacco Use    Smoking status: Never    Smokeless tobacco: Never   Substance Use Topics    Alcohol use: Not Currently    Drug use: Not Currently     Types: Marijuana     Medications:  Current Outpatient Medications   Medication  Instructions    amLODIPine (NORVASC) 10 mg, Oral, Daily    cyanocobalamin (vitamin B-12) 50 mcg, Oral, Every 7 days    cyclobenzaprine (FLEXERIL) 10 MG tablet 1 tablet, Oral, Daily    deferiprone (FERRIPROX) 1,000 mg Tab 2.5 tablets, Oral, 3 times daily    empagliflozin (JARDIANCE) 10 mg, Oral, Daily    ergocalciferol (VITAMIN D2) 50,000 Units, Oral, Every 7 days    fentaNYL (DURAGESIC) 75 mcg/hr 1 patch, Transdermal, Daily PRN    folic acid (FOLVITE) 1 mg, Oral, Daily    gabapentin (NEURONTIN) 300 MG capsule     hydroCHLOROthiazide (HYDRODIURIL) 12.5 MG Tab 1 tablet, Oral, Daily    hydroxyurea (HYDREA) 500 mg Cap TAKE TWO (2) CAPSULES BY MOUTH TWICE DAILY    ibuprofen (ADVIL,MOTRIN) 800 MG tablet     loratadine (CLARITIN) 10 mg, Oral, Daily    losartan (COZAAR) 50 mg, Oral, Daily    metoprolol succinate (TOPROL-XL) 50 mg, Oral, Daily    OXBRYTA 500 mg Tab 3 applicators, Oral, Daily    oxyCODONE-acetaminophen (PERCOCET)  mg per tablet TAKE ONE (1) TABLET BY MOUTH EVERY SIX (6) (SIX) HOURS AS NEEDED FOR PAIN.    spironolactone (ALDACTONE) 50 mg, Oral, Daily    tamsulosin (FLOMAX) 0.4 mg Cap 1 capsule, Oral, Nightly     ROS:  Review of Systems   Constitutional:  Positive for fatigue. Negative for appetite change, chills, fever and unexpected weight change.   HENT:  Negative for ear discharge, facial swelling, mouth sores, nosebleeds, sinus pressure/congestion and sore throat.    Eyes:  Negative for pain and visual disturbance.   Respiratory:  Negative for cough, chest tightness, shortness of breath and wheezing.    Cardiovascular:  Negative for chest pain, palpitations and leg swelling.   Gastrointestinal:  Negative for abdominal pain, blood in stool, change in bowel habit, constipation, diarrhea, nausea and vomiting.   Endocrine: Negative.    Genitourinary:  Negative for dysuria, frequency, hematuria and urgency.   Musculoskeletal:  Positive for back pain and gait problem. Negative for arthralgias, joint  swelling and myalgias.        Back pain, chronic   Integumentary:  Negative for color change, pallor, rash and wound.   Allergic/Immunologic: Negative.  Negative for frequent infections.   Neurological:  Positive for weakness. Negative for dizziness, vertigo, syncope, numbness and headaches.   Hematological:  Negative for adenopathy. Does not bruise/bleed easily.   Psychiatric/Behavioral:  Negative for confusion and dysphoric mood. The patient is not nervous/anxious.    All other systems reviewed and are negative.      Objective:   Vitals:  There were no vitals filed for this visit.     Wt Readings from Last 3 Encounters:   01/06/23 0914 87.8 kg (193 lb 9 oz)     Physical Exam  Vitals reviewed.   Constitutional:       General: He is not in acute distress.     Appearance: Normal appearance. He is normal weight. He is ill-appearing.   HENT:      Head: Normocephalic and atraumatic.      Nose: Nose normal.      Mouth/Throat:      Mouth: Mucous membranes are moist.      Pharynx: Oropharynx is clear. No oropharyngeal exudate or posterior oropharyngeal erythema.   Eyes:      Extraocular Movements: Extraocular movements intact.      Conjunctiva/sclera: Conjunctivae normal.      Pupils: Pupils are equal, round, and reactive to light.   Cardiovascular:      Rate and Rhythm: Normal rate and regular rhythm.      Pulses: Normal pulses.      Heart sounds: No murmur heard.     No friction rub. No gallop.   Pulmonary:      Effort: Pulmonary effort is normal. No respiratory distress.      Breath sounds: No wheezing, rhonchi or rales.   Abdominal:      General: Abdomen is flat. Bowel sounds are normal.      Palpations: Abdomen is soft. There is no mass.      Tenderness: There is no abdominal tenderness. There is no guarding.   Musculoskeletal:         General: No swelling, tenderness or deformity. Normal range of motion.      Cervical back: Normal range of motion and neck supple.      Right lower leg: No edema.      Left lower leg:  No edema.   Lymphadenopathy:      Cervical: No cervical adenopathy.   Skin:     General: Skin is warm and dry.      Findings: No erythema, lesion or rash.   Neurological:      General: No focal deficit present.      Mental Status: He is alert and oriented to person, place, and time. Mental status is at baseline.      Cranial Nerves: No cranial nerve deficit.      Gait: Gait normal.   Psychiatric:         Mood and Affect: Mood normal.         Behavior: Behavior normal.         Thought Content: Thought content normal.         Judgment: Judgment normal.        Labs:  No visits with results within 1 Week(s) from this visit.   Latest known visit with results is:   Lab Visit on 01/06/2023   Component Date Value    Sodium Level 01/06/2023 140     Potassium Level 01/06/2023 4.1     Chloride 01/06/2023 109 (H)     Carbon Dioxide 01/06/2023 25     Glucose Level 01/06/2023 88     Blood Urea Nitrogen 01/06/2023 13.0     Creatinine 01/06/2023 0.97     Calcium Level Total 01/06/2023 9.1     Protein Total 01/06/2023 6.8     Albumin Level 01/06/2023 3.3 (L)     Globulin 01/06/2023 3.5     Albumin/Globulin Ratio 01/06/2023 0.9 (L)     Bilirubin Total 01/06/2023 1.7 (H)     Alkaline Phosphatase 01/06/2023 67     Alanine Aminotransferase 01/06/2023 30     Aspartate Aminotransfera* 01/06/2023 37 (H)     eGFR 01/06/2023 >90     Ferritin Level 01/06/2023 6,637.78 (H)     WBC 01/06/2023 7.6     RBC 01/06/2023 2.53 (L)     Hgb 01/06/2023 11.7 (L)     Hct 01/06/2023 31.2 (L)     MCV 01/06/2023 123.3 (H)     MCH 01/06/2023 46.2     MCHC 01/06/2023 37.5 (H)     RDW 01/06/2023 16.6 (H)     Platelet 01/06/2023 296     MPV 01/06/2023 10.4     Neut % 01/06/2023 36.9     Lymph % 01/06/2023 45.6     Mono % 01/06/2023 12.2     Eos % 01/06/2023 3.7     Basophil % 01/06/2023 1.3     Lymph # 01/06/2023 3.44     Neut # 01/06/2023 2.79     Mono # 01/06/2023 0.92     Eos # 01/06/2023 0.28     Baso # 01/06/2023 0.10     IG# 01/06/2023 0.02     IG%  01/06/2023 0.3     RBC Morph 01/06/2023 Abnormal (A)     Anisocytosis 01/06/2023 1+ (A)     Elliptocytosis 01/06/2023 Slight (A)     Macrocytosis 01/06/2023 Slight (A)     Poikilocytosis 01/06/2023 1+ (A)     Polychromasia 01/06/2023 1+ (A)     Platelets 01/06/2023 Normal      11/1/22- wbc 8.84, hgb 12.7, plt 321, cr 1.05, AST 32, ALT 28 Ferritin 4839  5/16/22- WBC 9.10, Hgb 9.6, .1, Plt 297, Cr 0.93, AST 37, ALT 37, Ferritin 4457     Pathology:      Radiology/Diagnostics:    MRI of the abdomen without contrast 2/3/2022:   Liver is borderline prominent with right lobe measuring 16.2 cm. Liver remains of low signal intensity on all imaging sequences consistent with hemochromatosis. Slightly higher signal in the out of phase images relative to the in phase sequence, again compatible with hemochromatosis. The spleen is small and calcified. Pancreas unremarkable. Bilateral renal cysts with dominant 2.9 cm right interpolar cyst. Impression: Liver MRI findings compatible with hemochromatosis. Borderline liver size.     I have reviewed all available lab results and radiology reports.  Assessment:   Sickle Cell Disease   Cont Hydrea 1000mg BID, Ferriprox 2500 mg 3 times daily, oxbryta 1500 mg daily. Cont folic acid.   EYE EXAM - He did see Dr. Raza, OD in [place] recently   Echo done 2/24/21, no evidence of pulm htn.   Iron overload   Cont Ferriprox 2500mg TID - encouraged better compliance. If improved compliance does not improve ferritin, will need to consider alternate chelator - MRI of liver showed iron deposition 2/2022.   recommend transfusing 1 unit only for symptomatic anemia and then reassessing prior to giving more blood. In addition to continued risk of iron overload with transfusion, he is at risk for alloimmunization with additional transfusions as well.   Ferritin stable   Vitamin D Deficiency   Vitamin-D improving   Osteopenia   Cont Ca + Vit D.   Dr. Mcbride stopped Fosamax.   Proteinuria    seeing Dr. Lira regularly   Continue follow-ups with Nephrology.   Low Back Pain  Worsened after COVID infection.  He was instructed to contact orthopedic for more physical therapy   We have been refilling Oxycodone monthly    Left Hip Pain   better with regular exercise.  Followed by orthopedics, Dr. Mcbride regularly.        Plan:   Labs stable.  Cont Hydrea 1000 mg bid   Cont Ferriprox 2500 mg TID  Vitamin-D Continue 39920 units weekly.    Continue folic acid.  IV fluids and IV pain med today due to worsened pain and fatigue from recent flu.   RTC in three months with NP for FU/lab   CBC, CMP, vitamin-D, Ferritin   Encouraged to call for any questions or problems       Providers:  PCP: Jarett Paulino MD   Ortho: Dr. Mcbride ([place])   Renal: Dr. Lira ([place])

## 2024-01-18 DIAGNOSIS — D57.00 SICKLE CELL DISEASE WITH CRISIS: ICD-10-CM

## 2024-01-18 RX ORDER — DEFERIPRONE 1000 MG/1
2.5 TABLET ORAL 3 TIMES DAILY
Qty: 225 TABLET | Refills: 6 | Status: SHIPPED | OUTPATIENT
Start: 2024-01-18

## 2024-02-28 DIAGNOSIS — D57.00 SICKLE CELL DISEASE WITH CRISIS: ICD-10-CM

## 2024-02-28 RX ORDER — OXYCODONE AND ACETAMINOPHEN 10; 325 MG/1; MG/1
TABLET ORAL
Qty: 90 TABLET | Refills: 0 | Status: SHIPPED | OUTPATIENT
Start: 2024-02-28 | End: 2024-04-10 | Stop reason: SDUPTHER

## 2024-04-10 ENCOUNTER — LAB VISIT (OUTPATIENT)
Dept: LAB | Facility: HOSPITAL | Age: 39
End: 2024-04-10
Payer: MEDICARE

## 2024-04-10 ENCOUNTER — OFFICE VISIT (OUTPATIENT)
Dept: HEMATOLOGY/ONCOLOGY | Facility: CLINIC | Age: 39
End: 2024-04-10
Payer: MEDICARE

## 2024-04-10 VITALS
TEMPERATURE: 98 F | OXYGEN SATURATION: 100 % | HEART RATE: 80 BPM | HEIGHT: 66 IN | RESPIRATION RATE: 20 BRPM | SYSTOLIC BLOOD PRESSURE: 126 MMHG | WEIGHT: 185.63 LBS | DIASTOLIC BLOOD PRESSURE: 81 MMHG | BODY MASS INDEX: 29.83 KG/M2

## 2024-04-10 DIAGNOSIS — E83.19 IRON OVERLOAD: ICD-10-CM

## 2024-04-10 DIAGNOSIS — D57.00 SICKLE CELL DISEASE WITH CRISIS: ICD-10-CM

## 2024-04-10 DIAGNOSIS — Z79.64 ON HYDROXYUREA THERAPY: ICD-10-CM

## 2024-04-10 DIAGNOSIS — D57.00 SICKLE CELL DISEASE WITH CRISIS: Primary | ICD-10-CM

## 2024-04-10 DIAGNOSIS — E55.9 VITAMIN D DEFICIENCY: ICD-10-CM

## 2024-04-10 DIAGNOSIS — E55.9 VITAMIN D DEFICIENCY, UNSPECIFIED: ICD-10-CM

## 2024-04-10 LAB
ALBUMIN SERPL-MCNC: 3.4 G/DL (ref 3.5–5)
ALBUMIN/GLOB SERPL: 0.8 RATIO (ref 1.1–2)
ALP SERPL-CCNC: 82 UNIT/L (ref 40–150)
ALT SERPL-CCNC: 28 UNIT/L (ref 0–55)
AST SERPL-CCNC: 48 UNIT/L (ref 5–34)
BASOPHILS # BLD AUTO: 0.14 X10(3)/MCL
BASOPHILS NFR BLD AUTO: 1.3 %
BILIRUB SERPL-MCNC: 2.2 MG/DL
BUN SERPL-MCNC: 21 MG/DL (ref 8.9–20.6)
CALCIUM SERPL-MCNC: 9 MG/DL (ref 8.4–10.2)
CHLORIDE SERPL-SCNC: 109 MMOL/L (ref 98–107)
CO2 SERPL-SCNC: 24 MMOL/L (ref 22–29)
CREAT SERPL-MCNC: 1.32 MG/DL (ref 0.73–1.18)
DEPRECATED CALCIDIOL+CALCIFEROL SERPL-MC: 29.3 NG/ML (ref 30–80)
EOSINOPHIL # BLD AUTO: 0.63 X10(3)/MCL (ref 0–0.9)
EOSINOPHIL NFR BLD AUTO: 5.8 %
ERYTHROCYTE [DISTWIDTH] IN BLOOD BY AUTOMATED COUNT: 18.6 % (ref 11.5–17)
FERRITIN SERPL-MCNC: 7987.06 NG/ML (ref 21.81–274.66)
FOLATE SERPL-MCNC: 16.2 NG/ML (ref 7–31.4)
GFR SERPLBLD CREATININE-BSD FMLA CKD-EPI: >60 MLS/MIN/1.73/M2
GLOBULIN SER-MCNC: 4.5 GM/DL (ref 2.4–3.5)
GLUCOSE SERPL-MCNC: 97 MG/DL (ref 74–100)
HCT VFR BLD AUTO: 28.6 % (ref 42–52)
HGB BLD-MCNC: 10.8 G/DL (ref 14–18)
IMM GRANULOCYTES # BLD AUTO: 0.07 X10(3)/MCL (ref 0–0.04)
IMM GRANULOCYTES NFR BLD AUTO: 0.6 %
LYMPHOCYTES # BLD AUTO: 4.49 X10(3)/MCL (ref 0.6–4.6)
LYMPHOCYTES NFR BLD AUTO: 41.1 %
MAGNESIUM SERPL-MCNC: 1.8 MG/DL (ref 1.6–2.6)
MCH RBC QN AUTO: 42.4 PG (ref 27–31)
MCHC RBC AUTO-ENTMCNC: 37.8 G/DL (ref 33–36)
MCV RBC AUTO: 112.2 FL (ref 80–94)
MONOCYTES # BLD AUTO: 1.35 X10(3)/MCL (ref 0.1–1.3)
MONOCYTES NFR BLD AUTO: 12.4 %
NEUTROPHILS # BLD AUTO: 4.24 X10(3)/MCL (ref 2.1–9.2)
NEUTROPHILS NFR BLD AUTO: 38.8 %
NRBC BLD AUTO-RTO: 8.2 %
PHOSPHATE SERPL-MCNC: 3 MG/DL (ref 2.3–4.7)
PLATELET # BLD AUTO: 358 X10(3)/MCL (ref 130–400)
PMV BLD AUTO: 10.3 FL (ref 7.4–10.4)
POTASSIUM SERPL-SCNC: 4 MMOL/L (ref 3.5–5.1)
PROT SERPL-MCNC: 7.9 GM/DL (ref 6.4–8.3)
RBC # BLD AUTO: 2.55 X10(6)/MCL (ref 4.7–6.1)
SODIUM SERPL-SCNC: 139 MMOL/L (ref 136–145)
WBC # SPEC AUTO: 10.92 X10(3)/MCL (ref 4.5–11.5)

## 2024-04-10 PROCEDURE — 82306 VITAMIN D 25 HYDROXY: CPT

## 2024-04-10 PROCEDURE — 85025 COMPLETE CBC W/AUTO DIFF WBC: CPT

## 2024-04-10 PROCEDURE — 82746 ASSAY OF FOLIC ACID SERUM: CPT

## 2024-04-10 PROCEDURE — 99999 PR PBB SHADOW E&M-EST. PATIENT-LVL V: CPT | Mod: PBBFAC,,,

## 2024-04-10 PROCEDURE — 99215 OFFICE O/P EST HI 40 MIN: CPT | Mod: PBBFAC

## 2024-04-10 PROCEDURE — 84100 ASSAY OF PHOSPHORUS: CPT

## 2024-04-10 PROCEDURE — 99215 OFFICE O/P EST HI 40 MIN: CPT | Mod: S$PBB,,,

## 2024-04-10 PROCEDURE — 82728 ASSAY OF FERRITIN: CPT

## 2024-04-10 PROCEDURE — 83735 ASSAY OF MAGNESIUM: CPT

## 2024-04-10 PROCEDURE — 80053 COMPREHEN METABOLIC PANEL: CPT

## 2024-04-10 PROCEDURE — 36415 COLL VENOUS BLD VENIPUNCTURE: CPT

## 2024-04-10 RX ORDER — OXYCODONE AND ACETAMINOPHEN 10; 325 MG/1; MG/1
1 TABLET ORAL EVERY 6 HOURS PRN
Qty: 90 TABLET | Refills: 0 | Status: SHIPPED | OUTPATIENT
Start: 2024-04-10 | End: 2024-05-22 | Stop reason: SDUPTHER

## 2024-04-10 NOTE — PROGRESS NOTES
[facility] Hematology/Oncology  PROGRESS NOTE    Subjective:       Patient ID: Melchor Kumar is a 38 y.o. male.    Diagnosis:  Sickle Cell Disease  Hyperferrtienmia     Current Treatment:  Hydrea 1000mg BID  Ferriprox 2500mg TID  Oxbryta 1500mg daily     Treatment History:  Jadenu (stopped due to rash)  Exjade (stopped due to rash)    Chief Complaint: Sickle Cell Anemia (Pt c/o leg pain.)    HPI:  Diagnosis/Treatment History:   Sickle Cell Disease   - Hydrea 500mg po BID   - Folic acid  - Oxbryta   - ECHO 5/6/16   - Ophthalmology 1/31/18   Iron overload 2/2 transfusions   - Rash with exjade   - Ferriprox - started mid 7/2016   Vitamin D Deficiency     Interval History:  He returns to the clinic today for a follow up regarding his history of sickle cell disease. He also has worsened pain to left hip. He is followed by ortho regularly.  He continues to take his Hydrea, Ferriprox, and oxbryta as prescribed without any complications or concerns.  He denies any shortness of breath, chest pain, nausea, diarrhea, constipation, easy bruising or bleeding.      PMX/PSHx  Past Medical History:   Diagnosis Date    Sickle-cell disease without crisis       Past Surgical History:   Procedure Laterality Date    CARDIAC CATHETERIZATION      CHOLECYSTECTOMY      ESOPHAGOGASTRODUODENOSCOPY      MEDIPORT INSERTION, SINGLE      TONSILLECTOMY       Allergies:  Review of patient's allergies indicates:   Allergen Reactions    Deferasirox Rash    Povidone-iodine Rash      Social History:   Social History     Tobacco Use    Smoking status: Never    Smokeless tobacco: Never   Substance Use Topics    Alcohol use: Not Currently    Drug use: Not Currently     Types: Marijuana     Medications:  Current Outpatient Medications   Medication Instructions    amLODIPine (NORVASC) 10 mg, Oral, Daily    cyanocobalamin (vitamin B-12) 50 mcg, Oral, Every 7 days    cyclobenzaprine (FLEXERIL) 10 MG tablet 1 tablet, Oral, Daily    deferiprone (FERRIPROX)  1,000 mg Tab 2.5 tablets, Oral, 3 times daily    empagliflozin (JARDIANCE) 10 mg, Oral, Daily    ergocalciferol (VITAMIN D2) 50,000 Units, Oral, Every 7 days    folic acid (FOLVITE) 1 mg, Oral, Daily    gabapentin (NEURONTIN) 300 MG capsule     hydroCHLOROthiazide (HYDRODIURIL) 12.5 MG Tab 1 tablet, Oral, Daily    hydroxyurea (HYDREA) 500 mg Cap TAKE TWO (2) CAPSULES BY MOUTH TWICE DAILY    ibuprofen (ADVIL,MOTRIN) 800 MG tablet     loratadine (CLARITIN) 10 mg, Oral, Daily    losartan (COZAAR) 50 mg, Oral, Daily    metoprolol succinate (TOPROL-XL) 50 mg, Oral, Daily    OXBRYTA 1,500 mg, Oral, Daily    oxyCODONE-acetaminophen (PERCOCET)  mg per tablet TAKE ONE (1) TABLET BY MOUTH EVERY SIX (6) (SIX) HOURS AS NEEDED FOR PAIN.    spironolactone (ALDACTONE) 50 mg, Oral, Daily    tamsulosin (FLOMAX) 0.4 mg Cap 1 capsule, Oral, Nightly     ROS:  Review of Systems   Constitutional:  Positive for fatigue. Negative for appetite change, chills, fever and unexpected weight change.   HENT:  Negative for ear discharge, facial swelling, mouth sores, nosebleeds, sinus pressure/congestion and sore throat.    Eyes:  Negative for pain and visual disturbance.   Respiratory:  Negative for cough, chest tightness, shortness of breath and wheezing.    Cardiovascular:  Negative for chest pain, palpitations and leg swelling.   Gastrointestinal:  Negative for abdominal pain, blood in stool, change in bowel habit, constipation, diarrhea, nausea and vomiting.   Endocrine: Negative.    Genitourinary:  Negative for dysuria, frequency, hematuria and urgency.   Musculoskeletal:  Positive for back pain and gait problem. Negative for arthralgias, joint swelling and myalgias.        Back pain, chronic   Integumentary:  Negative for color change, pallor, rash and wound.   Allergic/Immunologic: Negative.  Negative for frequent infections.   Neurological:  Positive for weakness. Negative for dizziness, vertigo, syncope, numbness and headaches.    Hematological:  Negative for adenopathy. Does not bruise/bleed easily.   Psychiatric/Behavioral:  Negative for confusion and dysphoric mood. The patient is not nervous/anxious.    All other systems reviewed and are negative.      Objective:   Vitals:  Vitals:    04/10/24 1341   BP: 126/81   Pulse: 80   Resp: 20   Temp: 97.9 °F (36.6 °C)        Wt Readings from Last 3 Encounters:   04/10/24 1341 84.2 kg (185 lb 9.6 oz)   01/08/24 1350 83.9 kg (185 lb)   01/08/24 1301 83.9 kg (185 lb)     Physical Exam  Vitals reviewed.   Constitutional:       General: He is not in acute distress.     Appearance: Normal appearance. He is normal weight.   HENT:      Head: Normocephalic and atraumatic.      Nose: Nose normal.      Mouth/Throat:      Mouth: Mucous membranes are moist.      Pharynx: Oropharynx is clear. No oropharyngeal exudate or posterior oropharyngeal erythema.   Eyes:      Extraocular Movements: Extraocular movements intact.      Conjunctiva/sclera: Conjunctivae normal.      Pupils: Pupils are equal, round, and reactive to light.   Cardiovascular:      Rate and Rhythm: Normal rate and regular rhythm.      Pulses: Normal pulses.      Heart sounds: No murmur heard.     No friction rub. No gallop.   Pulmonary:      Effort: Pulmonary effort is normal. No respiratory distress.      Breath sounds: No wheezing, rhonchi or rales.   Abdominal:      General: Abdomen is flat. Bowel sounds are normal.      Palpations: Abdomen is soft. There is no mass.      Tenderness: There is no abdominal tenderness. There is no guarding.   Musculoskeletal:         General: No swelling, tenderness or deformity. Normal range of motion.      Cervical back: Normal range of motion and neck supple.      Right lower leg: No edema.      Left lower leg: No edema.   Lymphadenopathy:      Cervical: No cervical adenopathy.   Skin:     General: Skin is warm and dry.      Findings: No erythema, lesion or rash.   Neurological:      General: No focal  deficit present.      Mental Status: He is alert and oriented to person, place, and time. Mental status is at baseline.      Cranial Nerves: No cranial nerve deficit.      Gait: Gait normal.   Psychiatric:         Mood and Affect: Mood normal.         Behavior: Behavior normal.         Thought Content: Thought content normal.         Judgment: Judgment normal.        Labs:  Lab Visit on 04/10/2024   Component Date Value    WBC 04/10/2024 10.92     RBC 04/10/2024 2.55 (L)     Hgb 04/10/2024 10.8 (L)     Hct 04/10/2024 28.6 (L)     MCV 04/10/2024 112.2 (H)     MCH 04/10/2024 42.4 (H)     MCHC 04/10/2024 37.8 (H)     RDW 04/10/2024 18.6 (H)     Platelet 04/10/2024 358     MPV 04/10/2024 10.3     Neut % 04/10/2024 38.8     Lymph % 04/10/2024 41.1     Mono % 04/10/2024 12.4     Eos % 04/10/2024 5.8     Basophil % 04/10/2024 1.3     Lymph # 04/10/2024 4.49     Neut # 04/10/2024 4.24     Mono # 04/10/2024 1.35 (H)     Eos # 04/10/2024 0.63     Baso # 04/10/2024 0.14     IG# 04/10/2024 0.07 (H)     IG% 04/10/2024 0.6     NRBC% 04/10/2024 8.2      11/1/22- wbc 8.84, hgb 12.7, plt 321, cr 1.05, AST 32, ALT 28 Ferritin 4839  5/16/22- WBC 9.10, Hgb 9.6, .1, Plt 297, Cr 0.93, AST 37, ALT 37, Ferritin 4457     Pathology:      Radiology/Diagnostics:    MRI of the abdomen without contrast 2/3/2022:   Liver is borderline prominent with right lobe measuring 16.2 cm. Liver remains of low signal intensity on all imaging sequences consistent with hemochromatosis. Slightly higher signal in the out of phase images relative to the in phase sequence, again compatible with hemochromatosis. The spleen is small and calcified. Pancreas unremarkable. Bilateral renal cysts with dominant 2.9 cm right interpolar cyst. Impression: Liver MRI findings compatible with hemochromatosis. Borderline liver size.     I have reviewed all available lab results and radiology reports.  Assessment:   Sickle Cell Disease   Cont Hydrea 1000mg BID, Ferriprox  2500 mg 3 times daily, oxbryta 1500 mg daily. Cont folic acid.   EYE EXAM - He did see Dr. Raza, OD in [place] recently   Echo done 2/24/21, no evidence of pulm htn.   Iron overload   Cont Ferriprox 2500mg TID - encouraged better compliance. If improved compliance does not improve ferritin, will need to consider alternate chelator - MRI of liver showed iron deposition 2/2022.   recommend transfusing 1 unit only for symptomatic anemia and then reassessing prior to giving more blood. In addition to continued risk of iron overload with transfusion, he is at risk for alloimmunization with additional transfusions as well.   Ferritin stable   Vitamin D Deficiency   Vitamin-D improving   Osteopenia   Cont Ca + Vit D.   Dr. Mcbride stopped Fosamax.   Proteinuria   seeing Dr. Lira regularly   Continue follow-ups with Nephrology.   Low Back Pain  Worsened after COVID infection.  He was instructed to contact orthopedic for more physical therapy   We have been refilling Oxycodone monthly    Left Hip Pain   better with regular exercise.  Followed by orthopedics, Dr. Mcbride regularly.        Plan:   Labs stable.  Cont Hydrea 1000 mg bid   Cont Ferriprox 2500 mg TID  Vitamin-D Continue 04571 units weekly.    Continue folic acid.   RTC in 6 weeks  with MD for FU/lab   CBC, CMP,Ferritin   Encouraged to call for any questions or problems       Providers:  PCP: Jarett Paulino MD   Ortho: Dr. Mcbride ([place])   Renal: Dr. Lira ([place])         Rebecca Sandoval, FNP-C  Oncology/Hematology  [facility]

## 2024-05-22 ENCOUNTER — LAB VISIT (OUTPATIENT)
Dept: LAB | Facility: HOSPITAL | Age: 39
End: 2024-05-22
Payer: MEDICARE

## 2024-05-22 ENCOUNTER — OFFICE VISIT (OUTPATIENT)
Dept: HEMATOLOGY/ONCOLOGY | Facility: CLINIC | Age: 39
End: 2024-05-22
Payer: MEDICARE

## 2024-05-22 VITALS
TEMPERATURE: 98 F | WEIGHT: 185 LBS | RESPIRATION RATE: 18 BRPM | BODY MASS INDEX: 29.73 KG/M2 | HEIGHT: 66 IN | DIASTOLIC BLOOD PRESSURE: 86 MMHG | OXYGEN SATURATION: 99 % | HEART RATE: 61 BPM | SYSTOLIC BLOOD PRESSURE: 130 MMHG

## 2024-05-22 DIAGNOSIS — M87.00 AVASCULAR NECROSIS: Primary | ICD-10-CM

## 2024-05-22 DIAGNOSIS — M87.9 HIP PAIN WITH OSTEONECROSIS: ICD-10-CM

## 2024-05-22 DIAGNOSIS — Z79.64 ON HYDROXYUREA THERAPY: ICD-10-CM

## 2024-05-22 DIAGNOSIS — E83.19 IRON OVERLOAD: ICD-10-CM

## 2024-05-22 DIAGNOSIS — D57.00 SICKLE CELL DISEASE WITH CRISIS: Primary | ICD-10-CM

## 2024-05-22 DIAGNOSIS — D57.00 SICKLE CELL DISEASE WITH CRISIS: ICD-10-CM

## 2024-05-22 LAB
ALBUMIN SERPL-MCNC: 3.5 G/DL (ref 3.5–5)
ALBUMIN/GLOB SERPL: 0.7 RATIO (ref 1.1–2)
ALP SERPL-CCNC: 87 UNIT/L (ref 40–150)
ALT SERPL-CCNC: 24 UNIT/L (ref 0–55)
ANION GAP SERPL CALC-SCNC: 8 MEQ/L
AST SERPL-CCNC: 32 UNIT/L (ref 5–34)
BASOPHILS # BLD AUTO: 0.12 X10(3)/MCL
BASOPHILS NFR BLD AUTO: 1.3 %
BILIRUB SERPL-MCNC: 1.7 MG/DL
BUN SERPL-MCNC: 15 MG/DL (ref 8.9–20.6)
CALCIUM SERPL-MCNC: 9.1 MG/DL (ref 8.4–10.2)
CHLORIDE SERPL-SCNC: 109 MMOL/L (ref 98–107)
CO2 SERPL-SCNC: 26 MMOL/L (ref 22–29)
CREAT SERPL-MCNC: 1.33 MG/DL (ref 0.73–1.18)
CREAT/UREA NIT SERPL: 11
EOSINOPHIL # BLD AUTO: 0.37 X10(3)/MCL (ref 0–0.9)
EOSINOPHIL NFR BLD AUTO: 4 %
ERYTHROCYTE [DISTWIDTH] IN BLOOD BY AUTOMATED COUNT: 17.7 % (ref 11.5–17)
FERRITIN SERPL-MCNC: 6459.3 NG/ML (ref 21.81–274.66)
FOLATE SERPL-MCNC: 11.4 NG/ML (ref 7–31.4)
GFR SERPLBLD CREATININE-BSD FMLA CKD-EPI: >60 ML/MIN/1.73/M2
GLOBULIN SER-MCNC: 4.7 GM/DL (ref 2.4–3.5)
GLUCOSE SERPL-MCNC: 98 MG/DL (ref 74–100)
HCT VFR BLD AUTO: 29.6 % (ref 42–52)
HGB BLD-MCNC: 10.6 G/DL (ref 14–18)
IMM GRANULOCYTES # BLD AUTO: 0.04 X10(3)/MCL (ref 0–0.04)
IMM GRANULOCYTES NFR BLD AUTO: 0.4 %
LYMPHOCYTES # BLD AUTO: 3.8 X10(3)/MCL (ref 0.6–4.6)
LYMPHOCYTES NFR BLD AUTO: 41.3 %
MAGNESIUM SERPL-MCNC: 2 MG/DL (ref 1.6–2.6)
MCH RBC QN AUTO: 43.3 PG (ref 27–31)
MCHC RBC AUTO-ENTMCNC: 35.8 G/DL (ref 33–36)
MCV RBC AUTO: 120.8 FL (ref 80–94)
MONOCYTES # BLD AUTO: 0.79 X10(3)/MCL (ref 0.1–1.3)
MONOCYTES NFR BLD AUTO: 8.6 %
NEUTROPHILS # BLD AUTO: 4.08 X10(3)/MCL (ref 2.1–9.2)
NEUTROPHILS NFR BLD AUTO: 44.4 %
PHOSPHATE SERPL-MCNC: 3.2 MG/DL (ref 2.3–4.7)
PLATELET # BLD AUTO: 419 X10(3)/MCL (ref 130–400)
PMV BLD AUTO: 9.4 FL (ref 7.4–10.4)
POTASSIUM SERPL-SCNC: 4.5 MMOL/L (ref 3.5–5.1)
PROT SERPL-MCNC: 8.2 GM/DL (ref 6.4–8.3)
RBC # BLD AUTO: 2.45 X10(6)/MCL (ref 4.7–6.1)
SODIUM SERPL-SCNC: 143 MMOL/L (ref 136–145)
WBC # SPEC AUTO: 9.2 X10(3)/MCL (ref 4.5–11.5)

## 2024-05-22 PROCEDURE — 99999 PR PBB SHADOW E&M-EST. PATIENT-LVL V: CPT | Mod: PBBFAC,,, | Performed by: INTERNAL MEDICINE

## 2024-05-22 PROCEDURE — 82746 ASSAY OF FOLIC ACID SERUM: CPT

## 2024-05-22 PROCEDURE — 99214 OFFICE O/P EST MOD 30 MIN: CPT | Mod: S$PBB,,, | Performed by: INTERNAL MEDICINE

## 2024-05-22 PROCEDURE — 99215 OFFICE O/P EST HI 40 MIN: CPT | Mod: PBBFAC | Performed by: INTERNAL MEDICINE

## 2024-05-22 PROCEDURE — 83735 ASSAY OF MAGNESIUM: CPT

## 2024-05-22 PROCEDURE — 85025 COMPLETE CBC W/AUTO DIFF WBC: CPT

## 2024-05-22 PROCEDURE — 80053 COMPREHEN METABOLIC PANEL: CPT

## 2024-05-22 PROCEDURE — 82728 ASSAY OF FERRITIN: CPT

## 2024-05-22 PROCEDURE — 36415 COLL VENOUS BLD VENIPUNCTURE: CPT

## 2024-05-22 PROCEDURE — 84100 ASSAY OF PHOSPHORUS: CPT

## 2024-05-22 RX ORDER — OXYCODONE AND ACETAMINOPHEN 10; 325 MG/1; MG/1
1 TABLET ORAL EVERY 6 HOURS PRN
Qty: 90 TABLET | Refills: 0 | Status: SHIPPED | OUTPATIENT
Start: 2024-05-22 | End: 2024-06-14

## 2024-05-31 DIAGNOSIS — D57.00 SICKLE CELL DISEASE WITH CRISIS: Primary | ICD-10-CM

## 2024-06-11 DIAGNOSIS — E55.9 VITAMIN D DEFICIENCY: ICD-10-CM

## 2024-06-11 RX ORDER — ERGOCALCIFEROL 1.25 MG/1
CAPSULE ORAL
Qty: 30 CAPSULE | Refills: 2 | Status: SHIPPED | OUTPATIENT
Start: 2024-06-11

## 2024-07-03 ENCOUNTER — DOCUMENTATION ONLY (OUTPATIENT)
Dept: HEMATOLOGY/ONCOLOGY | Facility: CLINIC | Age: 39
End: 2024-07-03
Payer: MEDICARE

## 2024-07-03 DIAGNOSIS — M87.9 HIP PAIN WITH OSTEONECROSIS: ICD-10-CM

## 2024-07-03 DIAGNOSIS — D57.00 SICKLE CELL DISEASE WITH CRISIS: Primary | ICD-10-CM

## 2024-07-03 DIAGNOSIS — M87.00 AVASCULAR NECROSIS: ICD-10-CM

## 2024-07-03 RX ORDER — OXYCODONE AND ACETAMINOPHEN 10; 325 MG/1; MG/1
1 TABLET ORAL EVERY 4 HOURS PRN
Qty: 120 TABLET | Refills: 0 | Status: SHIPPED | OUTPATIENT
Start: 2024-07-03

## 2024-08-16 DIAGNOSIS — D57.00 SICKLE CELL DISEASE WITH CRISIS: ICD-10-CM

## 2024-08-16 RX ORDER — DEFERIPRONE 1000 MG/1
2.5 TABLET ORAL 3 TIMES DAILY
Qty: 225 TABLET | Refills: 6 | Status: SHIPPED | OUTPATIENT
Start: 2024-08-16

## 2024-08-21 DIAGNOSIS — D57.00 SICKLE CELL DISEASE WITH CRISIS: ICD-10-CM

## 2024-08-21 DIAGNOSIS — M87.9 HIP PAIN WITH OSTEONECROSIS: ICD-10-CM

## 2024-08-21 RX ORDER — OXYCODONE AND ACETAMINOPHEN 10; 325 MG/1; MG/1
1 TABLET ORAL EVERY 4 HOURS PRN
Qty: 120 TABLET | Refills: 0 | Status: SHIPPED | OUTPATIENT
Start: 2024-08-21

## 2024-09-06 DIAGNOSIS — D57.00 SICKLE CELL DISEASE WITH CRISIS: ICD-10-CM

## 2024-09-06 RX ORDER — HYDROXYUREA 500 MG/1
CAPSULE ORAL
Qty: 120 CAPSULE | Refills: 3 | Status: SHIPPED | OUTPATIENT
Start: 2024-09-06

## 2024-09-16 DIAGNOSIS — D57.00 SICKLE CELL DISEASE WITH CRISIS: Primary | ICD-10-CM

## 2024-09-20 NOTE — PROGRESS NOTES
[facility] Hematology/Oncology  PROGRESS NOTE    Subjective:       Patient ID: Melchor Kumar is a 39 y.o. male.    Diagnosis:  Sickle Cell Disease  Hyperferritenemia     Current Treatment:  Hydrea 1000mg BID  Ferriprox 2500mg TID  Oxbryta 1500mg daily     Treatment History:  Jadenu (stopped due to rash)  Exjade (stopped due to rash)    Chief Complaint: Chronic pain avascular necrosis.    HPI:  Diagnosis/Treatment History:   Sickle Cell Disease   - Hydrea 500mg po BID   - Folic acid  - Oxbryta   - ECHO 5/6/16   - Ophthalmology 1/31/18   Iron overload 2/2 transfusions   - Rash with exjade   - Ferriprox - started mid 7/2016   Vitamin D Deficiency     Interval History:    9/23/2024:  Mr Kumar is here today with his mother for follow-up and labs regarding sickle cell disease.  Today, Mr. Kumar reports he had a pain crisis triggered by URI on August 24, 2024.  He was treated in the ER in [place] with IV fluids and pain medication.  He was told he had a viral pneumonia.  He states he has not felt well since the 24th of August.  He is complaining of right-sided rib pain from coughing.  Lung sound RLL with crackles and diminished breath sounds.  All other lung fields WNL.  Pulse Ox is 99% on room air.  No SOB at this time.  He will contact his PCP today for an appointment regarding this complaint.  He reports compliance with Hydrea 500mg po BID, Oxbryta 1500 mg PO QD, Folic acid 1 mg PO QD, and Ferroprox 1000 mg PO TID.  He denies fever, chills, acute chest syndrome, chest pain, arm pain, confirms hip pain, denies leg ulcers, denies jaundice, and stroke-like and symptoms.   He confirms avascular necrosis of the hip.  He reports seeing ortho for avascular necrosis.  He saw pain management in [place] and was told to follow with hematology for pain management.  He takes Percocet 10/325 mg PO Q 4 hours prn for pain control of hips and when necessary pain crisis.  His weight is stable.  He is eating and drinking  well.        5/22/224:Having pain in his hip from avascular necrosis and back. He is on maximum dose of percocet. Will refer to pain management. No crisis in a long time.   4/2024:  He returns to the clinic today for a follow up regarding his history of sickle cell disease. He also has worsened pain to left hip. He is followed by ortho regularly.  He continues to take his Hydrea, Ferriprox, and oxbryta as prescribed without any complications or concerns.  He denies any shortness of breath, chest pain, nausea, diarrhea, constipation, easy bruising or bleeding.      PMX/PSHx  Past Medical History:   Diagnosis Date    Sickle-cell disease without crisis       Past Surgical History:   Procedure Laterality Date    CARDIAC CATHETERIZATION      CHOLECYSTECTOMY      ESOPHAGOGASTRODUODENOSCOPY      MEDIPORT INSERTION, SINGLE      TONSILLECTOMY       Allergies:  Review of patient's allergies indicates:   Allergen Reactions    Deferasirox Rash    Povidone-iodine Rash      Social History:   Social History     Tobacco Use    Smoking status: Never    Smokeless tobacco: Never   Substance Use Topics    Alcohol use: Not Currently    Drug use: Not Currently     Types: Marijuana     Medications:  Current Outpatient Medications   Medication Instructions    albuterol (PROVENTIL/VENTOLIN HFA) 90 mcg/actuation inhaler     amLODIPine (NORVASC) 10 mg, Oral, Daily    cyanocobalamin (vitamin B-12) 50 mcg, Oral, Every 7 days    cyclobenzaprine (FLEXERIL) 10 MG tablet 1 tablet, Oral, Daily    deferiprone (FERRIPROX) 1,000 mg Tab 2.5 tablets, Oral, 3 times daily    empagliflozin (JARDIANCE) 10 mg, Oral, Daily    ergocalciferol (ERGOCALCIFEROL) 50,000 unit Cap TAKE ONE (1) CAPSULE (50,000 UNITS TOTAL) BY MOUTH EVERY 7 DAYS.    folic acid (FOLVITE) 1 mg, Oral, Daily    gabapentin (NEURONTIN) 300 MG capsule     hydroCHLOROthiazide (HYDRODIURIL) 12.5 MG Tab 1 tablet, Oral, Daily    hydroxyurea (HYDREA) 500 mg Cap TAKE TWO (2) CAPSULES BY MOUTH TWICE  DAILY.    ibuprofen (ADVIL,MOTRIN) 800 MG tablet     loratadine (CLARITIN) 10 mg, Oral, Daily    losartan (COZAAR) 50 mg, Oral, Daily    metoprolol succinate (TOPROL-XL) 50 mg, Oral, Daily    OXBRYTA 1,500 mg, Oral, Daily    oxyCODONE-acetaminophen (PERCOCET)  mg per tablet 1 tablet, Oral, Every 4 hours PRN    tamsulosin (FLOMAX) 0.4 mg Cap 1 capsule, Oral, Nightly     ROS:  Review of Systems   Constitutional:  Positive for fatigue. Negative for appetite change, chills, fever and unexpected weight change.   HENT:  Negative for ear discharge, facial swelling, mouth sores, nosebleeds, sinus pressure/congestion and sore throat.    Eyes:  Negative for pain and visual disturbance.   Respiratory:  Negative for cough, chest tightness, shortness of breath and wheezing.    Cardiovascular:  Negative for chest pain, palpitations and leg swelling.   Gastrointestinal:  Negative for abdominal pain, blood in stool, change in bowel habit, constipation, diarrhea, nausea and vomiting.   Endocrine: Negative.    Genitourinary:  Negative for dysuria, frequency, hematuria and urgency.   Musculoskeletal:  Positive for back pain and gait problem. Negative for arthralgias, joint swelling and myalgias.        Back pain, chronic   Integumentary:  Negative for color change, pallor, rash and wound.   Allergic/Immunologic: Negative.  Negative for frequent infections.   Neurological:  Positive for weakness. Negative for dizziness, vertigo, syncope, numbness and headaches.   Hematological:  Negative for adenopathy. Does not bruise/bleed easily.   Psychiatric/Behavioral:  Negative for confusion and dysphoric mood. The patient is not nervous/anxious.    All other systems reviewed and are negative.      Objective:   Vitals:  Vitals:    09/23/24 1342   BP: 120/78   Pulse: 61   Resp: 16   Temp: 97.8 °F (36.6 °C)          Wt Readings from Last 3 Encounters:   09/23/24 1342 81.3 kg (179 lb 3.2 oz)   05/22/24 1239 83.9 kg (185 lb)   04/10/24 1341  84.2 kg (185 lb 9.6 oz)     Physical Exam  Vitals reviewed.   Constitutional:       General: He is not in acute distress.     Appearance: Normal appearance. He is normal weight.   HENT:      Head: Normocephalic and atraumatic.      Nose: Nose normal.      Mouth/Throat:      Mouth: Mucous membranes are moist.      Pharynx: Oropharynx is clear. No oropharyngeal exudate or posterior oropharyngeal erythema.   Eyes:      Extraocular Movements: Extraocular movements intact.      Conjunctiva/sclera: Conjunctivae normal.      Pupils: Pupils are equal, round, and reactive to light.   Cardiovascular:      Rate and Rhythm: Normal rate and regular rhythm.      Pulses: Normal pulses.      Heart sounds: No murmur heard.     No friction rub. No gallop.   Pulmonary:      Effort: Pulmonary effort is normal. No respiratory distress.      Breath sounds: No wheezing, rhonchi or rales.   Abdominal:      General: Abdomen is flat. Bowel sounds are normal.      Palpations: Abdomen is soft. There is no mass.      Tenderness: There is no abdominal tenderness. There is no guarding.   Musculoskeletal:         General: No swelling, tenderness or deformity. Normal range of motion.      Cervical back: Normal range of motion and neck supple.      Right lower leg: No edema.      Left lower leg: No edema.   Lymphadenopathy:      Cervical: No cervical adenopathy.   Skin:     General: Skin is warm and dry.      Findings: No erythema, lesion or rash.   Neurological:      General: No focal deficit present.      Mental Status: He is alert and oriented to person, place, and time. Mental status is at baseline.      Cranial Nerves: No cranial nerve deficit.      Gait: Gait normal.   Psychiatric:         Mood and Affect: Mood normal.         Behavior: Behavior normal.         Thought Content: Thought content normal.         Judgment: Judgment normal.        Labs:  Lab Visit on 09/23/2024   Component Date Value    Sodium 09/23/2024 139     Potassium  09/23/2024 3.8     Chloride 09/23/2024 106     CO2 09/23/2024 24     Glucose 09/23/2024 107 (H)     Blood Urea Nitrogen 09/23/2024 21.0 (H)     Creatinine 09/23/2024 1.24 (H)     Calcium 09/23/2024 9.1     Protein Total 09/23/2024 7.5     Albumin 09/23/2024 3.4 (L)     Globulin 09/23/2024 4.1 (H)     Albumin/Globulin Ratio 09/23/2024 0.8 (L)     Bilirubin Total 09/23/2024 1.4     ALP 09/23/2024 84     ALT 09/23/2024 16     AST 09/23/2024 27     eGFR 09/23/2024 >60     Anion Gap 09/23/2024 9.0     BUN/Creatinine Ratio 09/23/2024 17     Ferritin Level 09/23/2024 4,696.90 (H)     WBC 09/23/2024 5.72     RBC 09/23/2024 1.90 (L)     Hgb 09/23/2024 8.9 (L)     Hct 09/23/2024 23.9 (L)     MCV 09/23/2024 125.8 (H)     MCH 09/23/2024 46.8 (H)     MCHC 09/23/2024 37.2 (H)     RDW 09/23/2024 19.9 (H)     Platelet 09/23/2024 333     MPV 09/23/2024 9.7     Neut % 09/23/2024 27.6     Lymph % 09/23/2024 62.8     Mono % 09/23/2024 5.4     Eos % 09/23/2024 2.3     Basophil % 09/23/2024 1.7     Lymph # 09/23/2024 3.59     Neut # 09/23/2024 1.58 (L)     Mono # 09/23/2024 0.31     Eos # 09/23/2024 0.13     Baso # 09/23/2024 0.10     IG# 09/23/2024 0.01     IG% 09/23/2024 0.2      11/1/22- wbc 8.84, hgb 12.7, plt 321, cr 1.05, AST 32, ALT 28 Ferritin 4839  5/16/22- WBC 9.10, Hgb 9.6, .1, Plt 297, Cr 0.93, AST 37, ALT 37, Ferritin 4457     Pathology:      Radiology/Diagnostics:    MRI of the abdomen without contrast 2/3/2022:   Liver is borderline prominent with right lobe measuring 16.2 cm. Liver remains of low signal intensity on all imaging sequences consistent with hemochromatosis. Slightly higher signal in the out of phase images relative to the in phase sequence, again compatible with hemochromatosis. The spleen is small and calcified. Pancreas unremarkable. Bilateral renal cysts with dominant 2.9 cm right interpolar cyst. Impression: Liver MRI findings compatible with hemochromatosis. Borderline liver size.     I have  reviewed all available lab results and radiology reports.  Assessment:   Sickle Cell Disease   Cont Hydrea 1000mg BID, Ferriprox 2500 mg 3 times daily, oxbryta 1500 mg daily. Cont folic acid.   EYE EXAM - He did see Dr. Raza, OD in [place] recently   Echo done 2/24/21, no evidence of pulm htn.   Iron overload   Cont Ferriprox 2500mg TID - encouraged better compliance. If improved compliance does not improve ferritin, will need to consider alternate chelator - MRI of liver showed iron deposition 2/2022.   recommend transfusing 1 unit only for symptomatic anemia and then reassessing prior to giving more blood. In addition to continued risk of iron overload with transfusion, he is at risk for alloimmunization with additional transfusions as well.   Ferritin stable   Vitamin D Deficiency   Vitamin-D improving   Osteopenia   Cont Ca + Vit D.   Dr. Mcbride stopped Fosamax.   Proteinuria   seeing Dr. Lira regularly   Continue follow-ups with Nephrology.   Low Back Pain  Worsened after COVID infection.  He was instructed to contact orthopedic for more physical therapy   We have been refilling Oxycodone monthly    Left Hip Pain   better with regular exercise.  Followed by orthopedics, Dr. Mcbride regularly.        Plan:   Discussed having patient evaluated [facility] in [place] for pain management, disease control, and avascular necrosis, which he is in agreement.  He will contact the clinic this week for an appointment.  Refilled today, Percocet 10/325 mg PO Q 4 hours prn for chronic pain.  Continue to follow with ortho for avascular necrosis.  Cont Hydrea 1000 mg bid   Cont Ferriprox 2500 mg TID  Vitamin-D Continue 23121 units weekly.    Continue folic acid.   Continue to follow with nephrology for kidney issues (cyst and mass per patient).  RTC in 6 weeks  with MD for FU/lab   CBC, CMP,  hemoglobin electrophoreis to evaluate for hydrea compliance.  Encouraged to call for any questions or  problems.      Providers:  PCP: Jarett Paulino MD   Ortho: Dr. Mcbride ([place])   Renal: Dr. Lira ([place])          The patient is in agreement with today's plan of care.  All questions answered.  Patient is aware to contact our office for any problems or concerns prior to next visit.      Blanca Hodgson, MSN-ED, APRN, AGACNP-BC, OCN

## 2024-09-23 ENCOUNTER — LAB VISIT (OUTPATIENT)
Dept: LAB | Facility: HOSPITAL | Age: 39
End: 2024-09-23
Payer: MEDICARE

## 2024-09-23 ENCOUNTER — OFFICE VISIT (OUTPATIENT)
Dept: HEMATOLOGY/ONCOLOGY | Facility: CLINIC | Age: 39
End: 2024-09-23
Payer: MEDICARE

## 2024-09-23 VITALS
HEART RATE: 61 BPM | WEIGHT: 179.19 LBS | TEMPERATURE: 98 F | SYSTOLIC BLOOD PRESSURE: 120 MMHG | DIASTOLIC BLOOD PRESSURE: 78 MMHG | OXYGEN SATURATION: 99 % | BODY MASS INDEX: 28.8 KG/M2 | HEIGHT: 66 IN | RESPIRATION RATE: 16 BRPM

## 2024-09-23 DIAGNOSIS — D57.00 SICKLE CELL DISEASE WITH CRISIS: ICD-10-CM

## 2024-09-23 DIAGNOSIS — D57.00 SICKLE CELL DISEASE WITH CRISIS: Primary | ICD-10-CM

## 2024-09-23 DIAGNOSIS — M87.059 AVASCULAR NECROSIS OF BONE OF HIP, UNSPECIFIED LATERALITY: ICD-10-CM

## 2024-09-23 DIAGNOSIS — M87.9 HIP PAIN WITH OSTEONECROSIS: Primary | ICD-10-CM

## 2024-09-23 DIAGNOSIS — E55.9 VITAMIN D DEFICIENCY, UNSPECIFIED: ICD-10-CM

## 2024-09-23 DIAGNOSIS — G89.29 OTHER CHRONIC PAIN: ICD-10-CM

## 2024-09-23 LAB
ALBUMIN SERPL-MCNC: 3.4 G/DL (ref 3.5–5)
ALBUMIN/GLOB SERPL: 0.8 RATIO (ref 1.1–2)
ALP SERPL-CCNC: 84 UNIT/L (ref 40–150)
ALT SERPL-CCNC: 16 UNIT/L (ref 0–55)
ANION GAP SERPL CALC-SCNC: 9 MEQ/L
AST SERPL-CCNC: 27 UNIT/L (ref 5–34)
BASOPHILS # BLD AUTO: 0.1 X10(3)/MCL
BASOPHILS NFR BLD AUTO: 1.7 %
BILIRUB SERPL-MCNC: 1.4 MG/DL
BUN SERPL-MCNC: 21 MG/DL (ref 8.9–20.6)
CALCIUM SERPL-MCNC: 9.1 MG/DL (ref 8.4–10.2)
CHLORIDE SERPL-SCNC: 106 MMOL/L (ref 98–107)
CO2 SERPL-SCNC: 24 MMOL/L (ref 22–29)
CREAT SERPL-MCNC: 1.24 MG/DL (ref 0.73–1.18)
CREAT/UREA NIT SERPL: 17
EOSINOPHIL # BLD AUTO: 0.13 X10(3)/MCL (ref 0–0.9)
EOSINOPHIL NFR BLD AUTO: 2.3 %
ERYTHROCYTE [DISTWIDTH] IN BLOOD BY AUTOMATED COUNT: 19.9 % (ref 11.5–17)
FERRITIN SERPL-MCNC: 4696.9 NG/ML (ref 21.81–274.66)
GFR SERPLBLD CREATININE-BSD FMLA CKD-EPI: >60 ML/MIN/1.73/M2
GLOBULIN SER-MCNC: 4.1 GM/DL (ref 2.4–3.5)
GLUCOSE SERPL-MCNC: 107 MG/DL (ref 74–100)
HCT VFR BLD AUTO: 23.9 % (ref 42–52)
HGB BLD-MCNC: 8.9 G/DL (ref 14–18)
IMM GRANULOCYTES # BLD AUTO: 0.01 X10(3)/MCL (ref 0–0.04)
IMM GRANULOCYTES NFR BLD AUTO: 0.2 %
LYMPHOCYTES # BLD AUTO: 3.59 X10(3)/MCL (ref 0.6–4.6)
LYMPHOCYTES NFR BLD AUTO: 62.8 %
MCH RBC QN AUTO: 46.8 PG (ref 27–31)
MCHC RBC AUTO-ENTMCNC: 37.2 G/DL (ref 33–36)
MCV RBC AUTO: 125.8 FL (ref 80–94)
MONOCYTES # BLD AUTO: 0.31 X10(3)/MCL (ref 0.1–1.3)
MONOCYTES NFR BLD AUTO: 5.4 %
NEUTROPHILS # BLD AUTO: 1.58 X10(3)/MCL (ref 2.1–9.2)
NEUTROPHILS NFR BLD AUTO: 27.6 %
PLATELET # BLD AUTO: 333 X10(3)/MCL (ref 130–400)
PMV BLD AUTO: 9.7 FL (ref 7.4–10.4)
POTASSIUM SERPL-SCNC: 3.8 MMOL/L (ref 3.5–5.1)
PROT SERPL-MCNC: 7.5 GM/DL (ref 6.4–8.3)
RBC # BLD AUTO: 1.9 X10(6)/MCL (ref 4.7–6.1)
SODIUM SERPL-SCNC: 139 MMOL/L (ref 136–145)
WBC # BLD AUTO: 5.72 X10(3)/MCL (ref 4.5–11.5)

## 2024-09-23 PROCEDURE — 99214 OFFICE O/P EST MOD 30 MIN: CPT | Mod: S$PBB,,, | Performed by: NURSE PRACTITIONER

## 2024-09-23 PROCEDURE — 82728 ASSAY OF FERRITIN: CPT

## 2024-09-23 PROCEDURE — 99215 OFFICE O/P EST HI 40 MIN: CPT | Mod: PBBFAC | Performed by: NURSE PRACTITIONER

## 2024-09-23 PROCEDURE — 80053 COMPREHEN METABOLIC PANEL: CPT

## 2024-09-23 PROCEDURE — 36415 COLL VENOUS BLD VENIPUNCTURE: CPT

## 2024-09-23 PROCEDURE — 99999 PR PBB SHADOW E&M-EST. PATIENT-LVL V: CPT | Mod: PBBFAC,,, | Performed by: NURSE PRACTITIONER

## 2024-09-23 PROCEDURE — 85025 COMPLETE CBC W/AUTO DIFF WBC: CPT

## 2024-09-23 RX ORDER — ALBUTEROL SULFATE 90 UG/1
INHALANT RESPIRATORY (INHALATION)
COMMUNITY
Start: 2024-08-19

## 2024-09-23 RX ORDER — OXYCODONE AND ACETAMINOPHEN 10; 325 MG/1; MG/1
1 TABLET ORAL EVERY 4 HOURS PRN
Qty: 120 TABLET | Refills: 0 | Status: SHIPPED | OUTPATIENT
Start: 2024-09-23

## 2024-10-22 DIAGNOSIS — M87.9 HIP PAIN WITH OSTEONECROSIS: ICD-10-CM

## 2024-10-22 DIAGNOSIS — D57.00 SICKLE CELL DISEASE WITH CRISIS: ICD-10-CM

## 2024-10-22 RX ORDER — OXYCODONE AND ACETAMINOPHEN 10; 325 MG/1; MG/1
1 TABLET ORAL EVERY 4 HOURS PRN
Qty: 120 TABLET | Refills: 0 | Status: SHIPPED | OUTPATIENT
Start: 2024-10-22

## 2024-11-06 ENCOUNTER — LAB VISIT (OUTPATIENT)
Dept: LAB | Facility: HOSPITAL | Age: 39
End: 2024-11-06
Attending: INTERNAL MEDICINE
Payer: MEDICARE

## 2024-11-06 ENCOUNTER — OFFICE VISIT (OUTPATIENT)
Dept: HEMATOLOGY/ONCOLOGY | Facility: CLINIC | Age: 39
End: 2024-11-06
Payer: MEDICARE

## 2024-11-06 VITALS
DIASTOLIC BLOOD PRESSURE: 86 MMHG | TEMPERATURE: 98 F | BODY MASS INDEX: 30.7 KG/M2 | HEIGHT: 66 IN | OXYGEN SATURATION: 98 % | WEIGHT: 191 LBS | SYSTOLIC BLOOD PRESSURE: 150 MMHG | RESPIRATION RATE: 16 BRPM | HEART RATE: 66 BPM

## 2024-11-06 DIAGNOSIS — E55.9 VITAMIN D DEFICIENCY, UNSPECIFIED: ICD-10-CM

## 2024-11-06 DIAGNOSIS — E83.19 IRON OVERLOAD: ICD-10-CM

## 2024-11-06 DIAGNOSIS — D57.00 SICKLE CELL DISEASE WITH CRISIS: ICD-10-CM

## 2024-11-06 DIAGNOSIS — M87.059 AVASCULAR NECROSIS OF BONE OF HIP, UNSPECIFIED LATERALITY: ICD-10-CM

## 2024-11-06 DIAGNOSIS — G89.29 OTHER CHRONIC PAIN: ICD-10-CM

## 2024-11-06 DIAGNOSIS — D57.00 SICKLE CELL DISEASE WITH CRISIS: Primary | ICD-10-CM

## 2024-11-06 DIAGNOSIS — M87.00 AVASCULAR NECROSIS: ICD-10-CM

## 2024-11-06 DIAGNOSIS — Z79.64 ON HYDROXYUREA THERAPY: ICD-10-CM

## 2024-11-06 LAB
25(OH)D3+25(OH)D2 SERPL-MCNC: 47 NG/ML (ref 30–80)
ABS NEUT CALC (OHS): 3.79 X10(3)/MCL (ref 2.1–9.2)
ALBUMIN SERPL-MCNC: 3.2 G/DL (ref 3.5–5)
ALBUMIN/GLOB SERPL: 1 RATIO (ref 1.1–2)
ALP SERPL-CCNC: 67 UNIT/L (ref 40–150)
ALT SERPL-CCNC: 32 UNIT/L (ref 0–55)
ANION GAP SERPL CALC-SCNC: 8 MEQ/L
ANISOCYTOSIS BLD QL SMEAR: ABNORMAL
AST SERPL-CCNC: 75 UNIT/L (ref 5–34)
BILIRUB SERPL-MCNC: 1 MG/DL
BUN SERPL-MCNC: 19 MG/DL (ref 8.9–20.6)
CALCIUM SERPL-MCNC: 8.3 MG/DL (ref 8.4–10.2)
CHLORIDE SERPL-SCNC: 106 MMOL/L (ref 98–107)
CO2 SERPL-SCNC: 25 MMOL/L (ref 22–29)
CREAT SERPL-MCNC: 1.1 MG/DL (ref 0.72–1.25)
CREAT/UREA NIT SERPL: 17
ERYTHROCYTE [DISTWIDTH] IN BLOOD BY AUTOMATED COUNT: 18.8 % (ref 11.5–17)
FERRITIN SERPL-MCNC: 2661.85 NG/ML (ref 21.81–274.66)
GFR SERPLBLD CREATININE-BSD FMLA CKD-EPI: >60 ML/MIN/1.73/M2
GLOBULIN SER-MCNC: 3.1 GM/DL (ref 2.4–3.5)
GLUCOSE SERPL-MCNC: 92 MG/DL (ref 74–100)
HCT VFR BLD AUTO: 23 % (ref 42–52)
HGB BLD-MCNC: 8.6 G/DL (ref 14–18)
LYMPHOCYTES NFR BLD MANUAL: 3.63 X10(3)/MCL
LYMPHOCYTES NFR BLD MANUAL: 46 % (ref 13–40)
MACROCYTES BLD QL SMEAR: ABNORMAL
MCH RBC QN AUTO: 52.1 PG (ref 27–31)
MCHC RBC AUTO-ENTMCNC: 37.4 G/DL (ref 33–36)
MCV RBC AUTO: 139.4 FL (ref 80–94)
MONOCYTES NFR BLD MANUAL: 0.47 X10(3)/MCL (ref 0.1–1.3)
MONOCYTES NFR BLD MANUAL: 6 % (ref 2–11)
NEUTROPHILS NFR BLD MANUAL: 48 % (ref 47–80)
NRBC BLD MANUAL-RTO: 39 %
PLATELET # BLD AUTO: 283 X10(3)/MCL (ref 130–400)
PLATELET # BLD EST: ADEQUATE 10*3/UL
PMV BLD AUTO: 11.2 FL (ref 7.4–10.4)
POIKILOCYTOSIS BLD QL SMEAR: ABNORMAL
POTASSIUM SERPL-SCNC: 3.3 MMOL/L (ref 3.5–5.1)
PROT SERPL-MCNC: 6.3 GM/DL (ref 6.4–8.3)
RBC # BLD AUTO: 1.65 X10(6)/MCL (ref 4.7–6.1)
RBC MORPH BLD: ABNORMAL
SODIUM SERPL-SCNC: 139 MMOL/L (ref 136–145)
STOMATOCYTES (OLG): ABNORMAL
TARGETS BLD QL SMEAR: ABNORMAL
WBC # BLD AUTO: 7.9 X10(3)/MCL (ref 4.5–11.5)

## 2024-11-06 PROCEDURE — 80053 COMPREHEN METABOLIC PANEL: CPT

## 2024-11-06 PROCEDURE — 82728 ASSAY OF FERRITIN: CPT

## 2024-11-06 PROCEDURE — 82306 VITAMIN D 25 HYDROXY: CPT

## 2024-11-06 PROCEDURE — 99215 OFFICE O/P EST HI 40 MIN: CPT | Mod: PBBFAC

## 2024-11-06 PROCEDURE — 85025 COMPLETE CBC W/AUTO DIFF WBC: CPT

## 2024-11-06 PROCEDURE — 36415 COLL VENOUS BLD VENIPUNCTURE: CPT

## 2024-11-06 PROCEDURE — 99999 PR PBB SHADOW E&M-EST. PATIENT-LVL V: CPT | Mod: PBBFAC,,,

## 2024-11-06 RX ORDER — BUDESONIDE 4 MG/1
CAPSULE, DELAYED RELEASE ORAL
COMMUNITY
Start: 2024-10-31

## 2024-11-06 NOTE — PROGRESS NOTES
[facility] Hematology/Oncology  PROGRESS NOTE    Subjective:       Patient ID: Melchor Kumar is a 39 y.o. male.    Diagnosis:  Sickle Cell Disease  Hyperferritenemia     Current Treatment:  Hydrea 1000mg BID  Ferriprox 2500mg TID    Treatment History:  Jadenu (stopped due to rash)  Exjade (stopped due to rash)  Oxbryta 1500 mg daily stopped 7/2024 -taken off market     Chief Complaint: Chronic pain avascular necrosis.    HPI:  Diagnosis/Treatment History:   Sickle Cell Disease   - Hydrea 500mg po BID   - Folic acid  - Oxbryta   - ECHO 5/6/16   - Ophthalmology 1/31/18   Iron overload 2/2 transfusions   - Rash with exjade   - Ferriprox - started mid 7/2016   Vitamin D Deficiency     Interval History:    11/6/2024: Patient is here today for a follow up for sickle cell disease. He continues on Hydrea 1000 mg BID, Ferripox 2500 mg TID. He stopped Oxbryta about 4 months ago. It was taken off the market. He continues with back and leg pain that is controlled with Percocet 10 mg every four hours as needed for pain. He has an appointment on 11/14/24 with the sickle cell clinic in [place].     9/23/2024:  Mr Kumar is here today with his mother for follow-up and labs regarding sickle cell disease.  Today, Mr. Kumar reports he had a pain crisis triggered by URI on August 24, 2024.  He was treated in the ER in [place] with IV fluids and pain medication.  He was told he had a viral pneumonia.  He states he has not felt well since the 24th of August.  He is complaining of right-sided rib pain from coughing.  Lung sound RLL with crackles and diminished breath sounds.  All other lung fields WNL.  Pulse Ox is 99% on room air.  No SOB at this time.  He will contact his PCP today for an appointment regarding this complaint.  He reports compliance with Hydrea 500mg po BID, Oxbryta 1500 mg PO QD, Folic acid 1 mg PO QD, and Ferroprox 1000 mg PO TID.  He denies fever, chills, acute chest syndrome, chest pain, arm pain,  confirms hip pain, denies leg ulcers, denies jaundice, and stroke-like and symptoms.   He confirms avascular necrosis of the hip.  He reports seeing ortho for avascular necrosis.  He saw pain management in [place] and was told to follow with hematology for pain management.  He takes Percocet 10/325 mg PO Q 4 hours prn for pain control of hips and when necessary pain crisis.  His weight is stable.  He is eating and drinking well.        5/22/224:Having pain in his hip from avascular necrosis and back. He is on maximum dose of percocet. Will refer to pain management. No crisis in a long time.   4/2024:  He returns to the clinic today for a follow up regarding his history of sickle cell disease. He also has worsened pain to left hip. He is followed by ortho regularly.  He continues to take his Hydrea, Ferriprox, and oxbryta as prescribed without any complications or concerns.  He denies any shortness of breath, chest pain, nausea, diarrhea, constipation, easy bruising or bleeding.      PMX/PSHx  Past Medical History:   Diagnosis Date    Sickle-cell disease without crisis       Past Surgical History:   Procedure Laterality Date    CARDIAC CATHETERIZATION      CHOLECYSTECTOMY      ESOPHAGOGASTRODUODENOSCOPY      MEDIPORT INSERTION, SINGLE      TONSILLECTOMY       Allergies:  Review of patient's allergies indicates:   Allergen Reactions    Deferasirox Rash    Povidone-iodine Rash      Social History:   Social History     Tobacco Use    Smoking status: Never    Smokeless tobacco: Never   Substance Use Topics    Alcohol use: Not Currently    Drug use: Not Currently     Types: Marijuana     Medications:  Current Outpatient Medications   Medication Instructions    albuterol (PROVENTIL/VENTOLIN HFA) 90 mcg/actuation inhaler     amLODIPine (NORVASC) 10 mg, Daily    cyanocobalamin (vitamin B-12) 50 mcg, Every 7 days    cyclobenzaprine (FLEXERIL) 10 MG tablet 1 tablet, Daily    deferiprone (FERRIPROX) 1,000 mg Tab 2.5 tablets,  Oral, 3 times daily    empagliflozin (JARDIANCE) 10 mg, Daily    ergocalciferol (ERGOCALCIFEROL) 50,000 unit Cap TAKE ONE (1) CAPSULE (50,000 UNITS TOTAL) BY MOUTH EVERY 7 DAYS.    folic acid (FOLVITE) 1 mg, Daily    gabapentin (NEURONTIN) 300 MG capsule     hydroCHLOROthiazide (HYDRODIURIL) 12.5 MG Tab 1 tablet, Daily    hydroxyurea (HYDREA) 500 mg Cap TAKE TWO (2) CAPSULES BY MOUTH TWICE DAILY.    ibuprofen (ADVIL,MOTRIN) 800 MG tablet     loratadine (CLARITIN) 10 mg, Daily    losartan (COZAAR) 50 mg, Daily    metoprolol succinate (TOPROL-XL) 50 mg, Daily    OXBRYTA 1,500 mg, Oral, Daily    oxyCODONE-acetaminophen (PERCOCET)  mg per tablet 1 tablet, Oral, Every 4 hours PRN    tamsulosin (FLOMAX) 0.4 mg Cap 1 capsule, Nightly    TARPEYO 4 mg CpDR      ROS:  Review of Systems   Constitutional:  Positive for fatigue. Negative for appetite change, chills, fever and unexpected weight change.   HENT:  Negative for ear discharge, facial swelling, mouth sores, nosebleeds, sinus pressure/congestion and sore throat.    Eyes:  Negative for pain and visual disturbance.   Respiratory:  Negative for cough, chest tightness, shortness of breath and wheezing.    Cardiovascular:  Negative for chest pain, palpitations and leg swelling.   Gastrointestinal:  Negative for abdominal pain, blood in stool, change in bowel habit, constipation, diarrhea, nausea and vomiting.   Endocrine: Negative.    Genitourinary:  Negative for dysuria, frequency, hematuria and urgency.   Musculoskeletal:  Positive for back pain and gait problem. Negative for arthralgias, joint swelling and myalgias.        Back pain, chronic   Integumentary:  Negative for color change, pallor, rash and wound.   Allergic/Immunologic: Negative.  Negative for frequent infections.   Neurological:  Positive for weakness. Negative for dizziness, vertigo, syncope, numbness and headaches.   Hematological:  Negative for adenopathy. Does not bruise/bleed easily.    Psychiatric/Behavioral:  Negative for confusion and dysphoric mood. The patient is not nervous/anxious.    All other systems reviewed and are negative.      Objective:   Vitals:  Vitals:    11/06/24 0948   BP: (!) 150/86   Pulse: 66   Resp: 16   Temp: 98.1 °F (36.7 °C)          Wt Readings from Last 3 Encounters:   11/06/24 0948 86.6 kg (191 lb)   09/23/24 1342 81.3 kg (179 lb 3.2 oz)   05/22/24 1239 83.9 kg (185 lb)     Physical Exam  Vitals reviewed.   Constitutional:       General: He is not in acute distress.     Appearance: Normal appearance. He is normal weight.   HENT:      Head: Normocephalic and atraumatic.      Nose: Nose normal.      Mouth/Throat:      Mouth: Mucous membranes are moist.      Pharynx: Oropharynx is clear. No oropharyngeal exudate or posterior oropharyngeal erythema.   Eyes:      Extraocular Movements: Extraocular movements intact.      Conjunctiva/sclera: Conjunctivae normal.      Pupils: Pupils are equal, round, and reactive to light.   Cardiovascular:      Rate and Rhythm: Normal rate and regular rhythm.      Pulses: Normal pulses.      Heart sounds: No murmur heard.     No friction rub. No gallop.   Pulmonary:      Effort: Pulmonary effort is normal. No respiratory distress.      Breath sounds: No wheezing, rhonchi or rales.   Abdominal:      General: Abdomen is flat. Bowel sounds are normal.      Palpations: Abdomen is soft. There is no mass.      Tenderness: There is no abdominal tenderness. There is no guarding.   Musculoskeletal:         General: No swelling, tenderness or deformity. Normal range of motion.      Cervical back: Normal range of motion and neck supple.      Right lower leg: No edema.      Left lower leg: No edema.   Lymphadenopathy:      Cervical: No cervical adenopathy.   Skin:     General: Skin is warm and dry.      Findings: No erythema, lesion or rash.   Neurological:      General: No focal deficit present.      Mental Status: He is alert and oriented to person,  place, and time. Mental status is at baseline.      Cranial Nerves: No cranial nerve deficit.      Gait: Gait normal.   Psychiatric:         Mood and Affect: Mood normal.         Behavior: Behavior normal.         Thought Content: Thought content normal.         Judgment: Judgment normal.        Labs:  Lab Visit on 11/06/2024   Component Date Value    WBC 11/06/2024 7.90     RBC 11/06/2024 1.65 (L)     Hgb 11/06/2024 8.6 (L)     Hct 11/06/2024 23.0 (L)     MCV 11/06/2024 139.4 (H)     MCH 11/06/2024 52.1 (H)     MCHC 11/06/2024 37.4 (H)     RDW 11/06/2024 18.8 (H)     Platelet 11/06/2024 283     MPV 11/06/2024 11.2 (H)      11/1/22- wbc 8.84, hgb 12.7, plt 321, cr 1.05, AST 32, ALT 28 Ferritin 4839  5/16/22- WBC 9.10, Hgb 9.6, .1, Plt 297, Cr 0.93, AST 37, ALT 37, Ferritin 4457     Pathology:      Radiology/Diagnostics:    MRI of the abdomen without contrast 2/3/2022:   Liver is borderline prominent with right lobe measuring 16.2 cm. Liver remains of low signal intensity on all imaging sequences consistent with hemochromatosis. Slightly higher signal in the out of phase images relative to the in phase sequence, again compatible with hemochromatosis. The spleen is small and calcified. Pancreas unremarkable. Bilateral renal cysts with dominant 2.9 cm right interpolar cyst. Impression: Liver MRI findings compatible with hemochromatosis. Borderline liver size.     I have reviewed all available lab results and radiology reports.  Assessment:   Sickle Cell Disease   Cont Hydrea 1000mg BID, Ferriprox 2500 mg 3 times daily, oxbryta 1500 mg daily. Cont folic acid.   EYE EXAM - He did see Dr. Raza, OD in [place] recently   Echo done 2/24/21, no evidence of pulm htn.   Iron overload   Cont Ferriprox 2500mg TID - encouraged better compliance. If improved compliance does not improve ferritin, will need to consider alternate chelator - MRI of liver showed iron deposition 2/2022.   recommend transfusing 1 unit  only for symptomatic anemia and then reassessing prior to giving more blood. In addition to continued risk of iron overload with transfusion, he is at risk for alloimmunization with additional transfusions as well.   Ferritin stable   Vitamin D Deficiency   Vitamin-D improving   Osteopenia   Cont Ca + Vit D.   Dr. Mcbried stopped Fosamax.   Proteinuria   seeing Dr. Lira regularly   Continue follow-ups with Nephrology.   Low Back Pain  Worsened after COVID infection.  He was instructed to contact orthopedic for more physical therapy   We have been refilling Oxycodone monthly    Left Hip Pain   better with regular exercise.  Followed by orthopedics, Dr. Mcbride regularly.        Plan:   Discussed having patient evaluated at [facility] in [place] for pain management, disease control, and avascular necrosis, which he is in agreement.  Appointment on 11/14/24  Continue Percocet 10/325 mg PO Q 4 hours prn for chronic pain.  Continue to follow with ortho for avascular necrosis.  Cont Hydrea 1000 mg bid   Cont Ferriprox 2500 mg TID  Vitamin-D Continue 07047 units weekly.    Continue folic acid.   Continue to follow with nephrology for kidney issues (cyst and mass per patient).  RTC in 6 weeks  with MD for FU/lab   CBC, CMP,  Ferritin, Vit D   Encouraged to call for any questions or problems.      Providers:  PCP: Jarett Paulino MD   Ortho: Dr. Mcbride ([place])   Renal: Dr. Lira ([place])          The patient is in agreement with today's plan of care.  All questions answered.  Patient is aware to contact our office for any problems or concerns prior to next visit.      JUAN De La Cruz-PRETTY  Oncology/Hematology  [facility]

## 2024-11-13 ENCOUNTER — PATIENT MESSAGE (OUTPATIENT)
Dept: RESEARCH | Facility: HOSPITAL | Age: 39
End: 2024-11-13
Payer: MEDICARE

## 2024-11-25 DIAGNOSIS — D57.00 SICKLE CELL DISEASE WITH CRISIS: ICD-10-CM

## 2024-11-25 DIAGNOSIS — M87.9 HIP PAIN WITH OSTEONECROSIS: ICD-10-CM

## 2024-11-25 RX ORDER — OXYCODONE AND ACETAMINOPHEN 10; 325 MG/1; MG/1
1 TABLET ORAL EVERY 4 HOURS PRN
Qty: 120 TABLET | Refills: 0 | Status: SHIPPED | OUTPATIENT
Start: 2024-11-25

## 2024-12-18 ENCOUNTER — OFFICE VISIT (OUTPATIENT)
Dept: HEMATOLOGY/ONCOLOGY | Facility: CLINIC | Age: 39
End: 2024-12-18
Payer: MEDICARE

## 2024-12-18 ENCOUNTER — LAB VISIT (OUTPATIENT)
Dept: LAB | Facility: HOSPITAL | Age: 39
End: 2024-12-18
Payer: MEDICARE

## 2024-12-18 VITALS
RESPIRATION RATE: 16 BRPM | WEIGHT: 196 LBS | HEIGHT: 66 IN | TEMPERATURE: 98 F | HEART RATE: 70 BPM | OXYGEN SATURATION: 98 % | DIASTOLIC BLOOD PRESSURE: 88 MMHG | SYSTOLIC BLOOD PRESSURE: 136 MMHG | BODY MASS INDEX: 31.5 KG/M2

## 2024-12-18 DIAGNOSIS — M87.00 AVASCULAR NECROSIS: ICD-10-CM

## 2024-12-18 DIAGNOSIS — M87.059 AVASCULAR NECROSIS OF BONE OF HIP, UNSPECIFIED LATERALITY: ICD-10-CM

## 2024-12-18 DIAGNOSIS — E83.19 IRON OVERLOAD: ICD-10-CM

## 2024-12-18 DIAGNOSIS — D57.00 SICKLE CELL DISEASE WITH CRISIS: ICD-10-CM

## 2024-12-18 DIAGNOSIS — D84.821 DRUG-INDUCED IMMUNODEFICIENCY: Primary | ICD-10-CM

## 2024-12-18 DIAGNOSIS — Z79.64 ON HYDROXYUREA THERAPY: ICD-10-CM

## 2024-12-18 DIAGNOSIS — E55.9 VITAMIN D DEFICIENCY: ICD-10-CM

## 2024-12-18 DIAGNOSIS — G89.29 OTHER CHRONIC PAIN: ICD-10-CM

## 2024-12-18 DIAGNOSIS — M87.9 HIP PAIN WITH OSTEONECROSIS: ICD-10-CM

## 2024-12-18 DIAGNOSIS — Z79.899 DRUG-INDUCED IMMUNODEFICIENCY: Primary | ICD-10-CM

## 2024-12-18 DIAGNOSIS — E55.9 VITAMIN D DEFICIENCY, UNSPECIFIED: ICD-10-CM

## 2024-12-18 LAB
25(OH)D3+25(OH)D2 SERPL-MCNC: 36 NG/ML (ref 30–80)
ALBUMIN SERPL-MCNC: 3.4 G/DL (ref 3.5–5)
ALBUMIN/GLOB SERPL: 0.9 RATIO (ref 1.1–2)
ALP SERPL-CCNC: 60 UNIT/L (ref 40–150)
ALT SERPL-CCNC: 28 UNIT/L (ref 0–55)
ANION GAP SERPL CALC-SCNC: 7 MEQ/L
AST SERPL-CCNC: 24 UNIT/L (ref 5–34)
BASOPHILS # BLD AUTO: 0.05 X10(3)/MCL
BASOPHILS NFR BLD AUTO: 0.5 %
BILIRUB SERPL-MCNC: 1.2 MG/DL
BUN SERPL-MCNC: 17 MG/DL (ref 8.9–20.6)
CALCIUM SERPL-MCNC: 9.3 MG/DL (ref 8.4–10.2)
CHLORIDE SERPL-SCNC: 111 MMOL/L (ref 98–107)
CO2 SERPL-SCNC: 23 MMOL/L (ref 22–29)
CREAT SERPL-MCNC: 1.29 MG/DL (ref 0.72–1.25)
CREAT/UREA NIT SERPL: 13
EOSINOPHIL # BLD AUTO: 0.06 X10(3)/MCL (ref 0–0.9)
EOSINOPHIL NFR BLD AUTO: 0.6 %
ERYTHROCYTE [DISTWIDTH] IN BLOOD BY AUTOMATED COUNT: 14.7 % (ref 11.5–17)
FERRITIN SERPL-MCNC: 3035.78 NG/ML (ref 21.81–274.66)
GFR SERPLBLD CREATININE-BSD FMLA CKD-EPI: >60 ML/MIN/1.73/M2
GLOBULIN SER-MCNC: 3.6 GM/DL (ref 2.4–3.5)
GLUCOSE SERPL-MCNC: 96 MG/DL (ref 74–100)
HCT VFR BLD AUTO: 30.7 % (ref 42–52)
HGB BLD-MCNC: 11.4 G/DL (ref 14–18)
IMM GRANULOCYTES # BLD AUTO: 0.03 X10(3)/MCL (ref 0–0.04)
IMM GRANULOCYTES NFR BLD AUTO: 0.3 %
LYMPHOCYTES # BLD AUTO: 4.56 X10(3)/MCL (ref 0.6–4.6)
LYMPHOCYTES NFR BLD AUTO: 48.3 %
MCH RBC QN AUTO: 52.5 PG (ref 27–31)
MCHC RBC AUTO-ENTMCNC: 37.1 G/DL (ref 33–36)
MCV RBC AUTO: 141.5 FL (ref 80–94)
MONOCYTES # BLD AUTO: 0.65 X10(3)/MCL (ref 0.1–1.3)
MONOCYTES NFR BLD AUTO: 6.9 %
NEUTROPHILS # BLD AUTO: 4.1 X10(3)/MCL (ref 2.1–9.2)
NEUTROPHILS NFR BLD AUTO: 43.4 %
PLATELET # BLD AUTO: 344 X10(3)/MCL (ref 130–400)
PMV BLD AUTO: 10.2 FL (ref 7.4–10.4)
POTASSIUM SERPL-SCNC: 4 MMOL/L (ref 3.5–5.1)
PROT SERPL-MCNC: 7 GM/DL (ref 6.4–8.3)
RBC # BLD AUTO: 2.17 X10(6)/MCL (ref 4.7–6.1)
SODIUM SERPL-SCNC: 141 MMOL/L (ref 136–145)
WBC # BLD AUTO: 9.45 X10(3)/MCL (ref 4.5–11.5)

## 2024-12-18 PROCEDURE — 82306 VITAMIN D 25 HYDROXY: CPT

## 2024-12-18 PROCEDURE — 36415 COLL VENOUS BLD VENIPUNCTURE: CPT

## 2024-12-18 PROCEDURE — 99999 PR PBB SHADOW E&M-EST. PATIENT-LVL V: CPT | Mod: PBBFAC,,,

## 2024-12-18 PROCEDURE — 80053 COMPREHEN METABOLIC PANEL: CPT

## 2024-12-18 PROCEDURE — 99215 OFFICE O/P EST HI 40 MIN: CPT | Mod: PBBFAC

## 2024-12-18 PROCEDURE — 82728 ASSAY OF FERRITIN: CPT

## 2024-12-18 PROCEDURE — 85025 COMPLETE CBC W/AUTO DIFF WBC: CPT

## 2024-12-18 RX ORDER — OXYCODONE AND ACETAMINOPHEN 10; 325 MG/1; MG/1
1 TABLET ORAL EVERY 4 HOURS PRN
Qty: 120 TABLET | Refills: 0 | Status: SHIPPED | OUTPATIENT
Start: 2024-12-18

## 2024-12-18 NOTE — PROGRESS NOTES
[facility] Hematology/Oncology  PROGRESS NOTE    Subjective:       Patient ID: Melchor Kumar is a 39 y.o. male.    Diagnosis:  Sickle Cell Disease  Hyperferritenemia     Current Treatment:  Hydrea 1000mg BID  Ferriprox 2500mg TID    Treatment History:  Jadenu (stopped due to rash)  Exjade (stopped due to rash)  Oxbryta 1500 mg daily stopped 7/2024 -taken off market     Chief Complaint: Chronic pain avascular necrosis.    HPI:  Diagnosis/Treatment History:   Sickle Cell Disease   - Hydrea 500mg po BID   - Folic acid  - Oxbryta   - ECHO 5/6/16   - Ophthalmology 1/31/18   Iron overload 2/2 transfusions   - Rash with exjade   - Ferriprox - started mid 7/2016   Vitamin D Deficiency     Interval History:    12/18/2024: Patient presents for a follow up for sickle cell disease. He continues on Hydrea 1000 mg BID, Ferriprox 2500 mg TID, folic acid, and Vitamin D. He continues with lower back pain, 6/10. He is taking Percocet 10 mg as needed for pain. 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 He has an appointment on 1/16/2025 with the sickle cell clinic on [place]. Denies any infection, headache, constipation, diarrhea, nausea, or dizziness.     11/6/2024: Patient is here today for a follow up for sickle cell disease. He continues on Hydrea 1000 mg BID, Ferripox 2500 mg TID. He stopped Oxbryta about 4 months ago. It was taken off the market. He continues with back and leg pain that is controlled with Percocet 10 mg every four hours as needed for pain. He has an appointment on 11/14/24 with the sickle cell clinic in [place].     9/23/2024:  Mr Kumar is here today with his mother for follow-up and labs regarding sickle cell disease.  Today, Mr. Kumar reports he had a pain crisis triggered by URI on August 24, 2024.  He was treated in the ER in [place] with IV fluids and pain medication.  He was told he had a viral pneumonia.  He states he has not felt well  since the 24th of August.  He is complaining of right-sided rib pain from coughing.  Lung sound RLL with crackles and diminished breath sounds.  All other lung fields WNL.  Pulse Ox is 99% on room air.  No SOB at this time.  He will contact his PCP today for an appointment regarding this complaint.  He reports compliance with Hydrea 500mg po BID, Oxbryta 1500 mg PO QD, Folic acid 1 mg PO QD, and Ferroprox 1000 mg PO TID.  He denies fever, chills, acute chest syndrome, chest pain, arm pain, confirms hip pain, denies leg ulcers, denies jaundice, and stroke-like and symptoms.   He confirms avascular necrosis of the hip.  He reports seeing ortho for avascular necrosis.  He saw pain management in [place] and was told to follow with hematology for pain management.  He takes Percocet 10/325 mg PO Q 4 hours prn for pain control of hips and when necessary pain crisis.  His weight is stable.  He is eating and drinking well.        5/22/224:Having pain in his hip from avascular necrosis and back. He is on maximum dose of percocet. Will refer to pain management. No crisis in a long time.   4/2024:  He returns to the clinic today for a follow up regarding his history of sickle cell disease. He also has worsened pain to left hip. He is followed by ortho regularly.  He continues to take his Hydrea, Ferriprox, and oxbryta as prescribed without any complications or concerns.  He denies any shortness of breath, chest pain, nausea, diarrhea, constipation, easy bruising or bleeding.      PMX/PSHx  Past Medical History:   Diagnosis Date    Sickle-cell disease without crisis       Past Surgical History:   Procedure Laterality Date    CARDIAC CATHETERIZATION      CHOLECYSTECTOMY      ESOPHAGOGASTRODUODENOSCOPY      MEDIPORT INSERTION, SINGLE      TONSILLECTOMY       Allergies:  Review of patient's allergies indicates:   Allergen Reactions    Deferasirox Rash    Povidone-iodine Rash      Social History:   Social History     Tobacco Use     Smoking status: Never    Smokeless tobacco: Never   Substance Use Topics    Alcohol use: Not Currently    Drug use: Not Currently     Types: Marijuana     Medications:  Current Outpatient Medications   Medication Instructions    albuterol (PROVENTIL/VENTOLIN HFA) 90 mcg/actuation inhaler     amLODIPine (NORVASC) 10 mg, Daily    cyanocobalamin (vitamin B-12) 50 mcg, Every 7 days    cyclobenzaprine (FLEXERIL) 10 MG tablet 1 tablet, Daily    deferiprone (FERRIPROX) 1,000 mg Tab 2.5 tablets, Oral, 3 times daily    empagliflozin (JARDIANCE) 10 mg, Daily    ergocalciferol (ERGOCALCIFEROL) 50,000 unit Cap TAKE ONE (1) CAPSULE (50,000 UNITS TOTAL) BY MOUTH EVERY 7 DAYS.    folic acid (FOLVITE) 1 mg, Daily    gabapentin (NEURONTIN) 300 MG capsule     hydroCHLOROthiazide (HYDRODIURIL) 12.5 MG Tab 1 tablet, Daily    hydroxyurea (HYDREA) 500 mg Cap TAKE TWO (2) CAPSULES BY MOUTH TWICE DAILY.    ibuprofen (ADVIL,MOTRIN) 800 MG tablet     loratadine (CLARITIN) 10 mg, Daily    losartan (COZAAR) 50 mg, Daily    metoprolol succinate (TOPROL-XL) 50 mg, Daily    OXBRYTA 1,500 mg, Oral, Daily    oxyCODONE-acetaminophen (PERCOCET)  mg per tablet 1 tablet, Oral, Every 4 hours PRN    tamsulosin (FLOMAX) 0.4 mg Cap 1 capsule, Nightly    TARPEYO 4 mg CpDR      ROS:  Review of Systems   Constitutional:  Positive for fatigue. Negative for appetite change, chills, fever and unexpected weight change.   HENT:  Negative for ear discharge, facial swelling, mouth sores, nosebleeds, sinus pressure/congestion and sore throat.    Eyes:  Negative for pain and visual disturbance.   Respiratory:  Negative for cough, chest tightness, shortness of breath and wheezing.    Cardiovascular:  Negative for chest pain, palpitations and leg swelling.   Gastrointestinal:  Negative for abdominal pain, blood in stool, change in bowel habit, constipation, diarrhea, nausea and vomiting.   Endocrine: Negative.    Genitourinary:  Negative for dysuria, frequency,  hematuria and urgency.   Musculoskeletal:  Positive for back pain and gait problem. Negative for arthralgias, joint swelling and myalgias.        Back pain, chronic   Integumentary:  Negative for color change, pallor, rash and wound.   Allergic/Immunologic: Negative.  Negative for frequent infections.   Neurological:  Positive for weakness. Negative for dizziness, vertigo, syncope, numbness and headaches.   Hematological:  Negative for adenopathy. Does not bruise/bleed easily.   Psychiatric/Behavioral:  Negative for confusion and dysphoric mood. The patient is not nervous/anxious.    All other systems reviewed and are negative.      Objective:   Vitals:  There were no vitals filed for this visit.         Wt Readings from Last 3 Encounters:   11/06/24 0948 86.6 kg (191 lb)   09/23/24 1342 81.3 kg (179 lb 3.2 oz)   05/22/24 1239 83.9 kg (185 lb)     Physical Exam  Vitals reviewed.   Constitutional:       General: He is not in acute distress.     Appearance: Normal appearance. He is normal weight.   HENT:      Head: Normocephalic and atraumatic.      Nose: Nose normal.      Mouth/Throat:      Mouth: Mucous membranes are moist.      Pharynx: Oropharynx is clear. No oropharyngeal exudate or posterior oropharyngeal erythema.   Eyes:      Extraocular Movements: Extraocular movements intact.      Conjunctiva/sclera: Conjunctivae normal.      Pupils: Pupils are equal, round, and reactive to light.   Cardiovascular:      Rate and Rhythm: Normal rate and regular rhythm.      Pulses: Normal pulses.      Heart sounds: No murmur heard.     No friction rub. No gallop.   Pulmonary:      Effort: Pulmonary effort is normal. No respiratory distress.      Breath sounds: No wheezing, rhonchi or rales.   Abdominal:      General: Abdomen is flat. Bowel sounds are normal.      Palpations: Abdomen is soft. There is no mass.      Tenderness: There is no abdominal tenderness. There is no guarding.   Musculoskeletal:         General: No  swelling, tenderness or deformity. Normal range of motion.      Cervical back: Normal range of motion and neck supple.      Right lower leg: No edema.      Left lower leg: No edema.   Lymphadenopathy:      Cervical: No cervical adenopathy.   Skin:     General: Skin is warm and dry.      Findings: No erythema, lesion or rash.   Neurological:      General: No focal deficit present.      Mental Status: He is alert and oriented to person, place, and time. Mental status is at baseline.      Cranial Nerves: No cranial nerve deficit.      Gait: Gait normal.   Psychiatric:         Mood and Affect: Mood normal.         Behavior: Behavior normal.         Thought Content: Thought content normal.         Judgment: Judgment normal.        Labs:  Lab Visit on 12/18/2024   Component Date Value    WBC 12/18/2024 9.45     RBC 12/18/2024 2.17 (L)     Hgb 12/18/2024 11.4 (L)     Hct 12/18/2024 30.7 (L)     MCV 12/18/2024 141.5 (H)     MCH 12/18/2024 52.5 (H)     MCHC 12/18/2024 37.1 (H)     RDW 12/18/2024 14.7     Platelet 12/18/2024 344     MPV 12/18/2024 10.2     Neut % 12/18/2024 43.4     Lymph % 12/18/2024 48.3     Mono % 12/18/2024 6.9     Eos % 12/18/2024 0.6     Basophil % 12/18/2024 0.5     Lymph # 12/18/2024 4.56     Neut # 12/18/2024 4.10     Mono # 12/18/2024 0.65     Eos # 12/18/2024 0.06     Baso # 12/18/2024 0.05     IG# 12/18/2024 0.03     IG% 12/18/2024 0.3      11/1/22- wbc 8.84, hgb 12.7, plt 321, cr 1.05, AST 32, ALT 28 Ferritin 4839  5/16/22- WBC 9.10, Hgb 9.6, .1, Plt 297, Cr 0.93, AST 37, ALT 37, Ferritin 4457     Pathology:      Radiology/Diagnostics:    MRI of the abdomen without contrast 2/3/2022:   Liver is borderline prominent with right lobe measuring 16.2 cm. Liver remains of low signal intensity on all imaging sequences consistent with hemochromatosis. Slightly higher signal in the out of phase images relative to the in phase sequence, again compatible with hemochromatosis. The spleen is small and  calcified. Pancreas unremarkable. Bilateral renal cysts with dominant 2.9 cm right interpolar cyst. Impression: Liver MRI findings compatible with hemochromatosis. Borderline liver size.     I have reviewed all available lab results and radiology reports.  Assessment:   Sickle Cell Disease   Cont Hydrea 1000mg BID, Ferriprox 2500 mg 3 times daily, oxbryta 1500 mg daily. Cont folic acid.   EYE EXAM - He did see Dr. Raza, OD in [place] recently   Echo done 2/24/21, no evidence of pulm htn.   Iron overload   Cont Ferriprox 2500mg TID - encouraged better compliance. If improved compliance does not improve ferritin, will need to consider alternate chelator - MRI of liver showed iron deposition 2/2022.   recommend transfusing 1 unit only for symptomatic anemia and then reassessing prior to giving more blood. In addition to continued risk of iron overload with transfusion, he is at risk for alloimmunization with additional transfusions as well.   Ferritin stable   Vitamin D Deficiency   Vitamin-D improving   Osteopenia   Cont Ca + Vit D.   Dr. Mcbride stopped Fosamax.   Proteinuria   seeing Dr. Lira regularly   Continue follow-ups with Nephrology.   Low Back Pain  Worsened after COVID infection.  He was instructed to contact orthopedic for more physical therapy   We have been refilling Oxycodone monthly    Left Hip Pain   better with regular exercise.  Followed by orthopedics, Dr. Mcbride regularly.        Plan:   Discussed having patient evaluated at [facility] in [place] for pain management, disease control, and avascular necrosis, which he is in agreement.  Appointment on 1/16/25  Continue Percocet 10/325 mg PO Q 4 hours prn for chronic pain. Refill sent  Continue to follow with ortho for avascular necrosis.  Cont Hydrea 1000 mg bid   Cont Ferriprox 2500 mg TID  Vitamin-D Continue 78883 units weekly.    Continue folic acid.   Continue to follow with nephrology for kidney issues (cyst and mass  per patient).  RTC in 6 weeks  with MD for FU/lab   CBC, CMP,  Ferritin, Vit D   Encouraged to call for any questions or problems.      Providers:  PCP: Jarett Paulino MD   Ortho: Dr. Mcbride ([place])   Renal: Dr. Lira ([place])          The patient is in agreement with today's plan of care.  All questions answered.  Patient is aware to contact our office for any problems or concerns prior to next visit.      Rebecca Sandoval, JUAN-C  Oncology/Hematology  [facility]

## 2025-01-24 DIAGNOSIS — D57.00 SICKLE CELL DISEASE WITH CRISIS: ICD-10-CM

## 2025-01-24 DIAGNOSIS — M87.9 HIP PAIN WITH OSTEONECROSIS: ICD-10-CM

## 2025-01-24 RX ORDER — OXYCODONE AND ACETAMINOPHEN 10; 325 MG/1; MG/1
1 TABLET ORAL EVERY 4 HOURS PRN
Qty: 120 TABLET | Refills: 0 | Status: CANCELLED | OUTPATIENT
Start: 2025-01-24

## 2025-01-27 DIAGNOSIS — M87.9 HIP PAIN WITH OSTEONECROSIS: ICD-10-CM

## 2025-01-27 DIAGNOSIS — D57.00 SICKLE CELL DISEASE WITH CRISIS: ICD-10-CM

## 2025-01-27 RX ORDER — OXYCODONE AND ACETAMINOPHEN 10; 325 MG/1; MG/1
1 TABLET ORAL EVERY 4 HOURS PRN
Qty: 120 TABLET | Refills: 0 | Status: SHIPPED | OUTPATIENT
Start: 2025-01-27

## 2025-01-29 ENCOUNTER — DOCUMENTATION ONLY (OUTPATIENT)
Dept: HEMATOLOGY/ONCOLOGY | Facility: CLINIC | Age: 40
End: 2025-01-29
Payer: MEDICARE

## 2025-01-29 NOTE — PROGRESS NOTES
Spoke with patient and he informed me that he did see hematologist  in [place] but it is too far to drive. So he is going to continue to come to [place].

## 2025-01-29 NOTE — PROGRESS NOTES
[facility] Hematology/Oncology  PROGRESS NOTE    Subjective:       Patient ID: Melchor Kumar is a 39 y.o. male.    Diagnosis:  Sickle Cell Disease  Hyperferritenemia     Current Treatment:  Hydrea 1000mg BID  Ferriprox 2500mg TID    Treatment History:  Jadenu (stopped due to rash)  Exjade (stopped due to rash)  Oxbryta 1500 mg daily stopped 7/2024 -taken off market     Chief Complaint: Sickle Cell Anemia (No complaints.)       HPI:  Diagnosis/Treatment History:   Sickle Cell Disease   - Hydrea 500mg po BID   - Folic acid  - Oxbryta   - ECHO 5/6/16   - Ophthalmology 1/31/18   Iron overload 2/2 transfusions   - Rash with exjade   - Ferriprox - started mid 7/2016   Vitamin D Deficiency     Interval History:  1/30/2025: He returns to the clinic today for a follow-up for sickle cell disease. He was seen by the sickle cell clinic in [place] on 1/16/2025 and does not want to return due to the distance. He continues on Hydrea 1000 mg BID, Ferriprox 2500 mg TID, folic acid, and Vitamin D. He continues with lower back pain that is stable and controlled. He is taking Percocet 10 mg as needed for pain. He currently has the flu and is on tamiflu for this. Denies any headache, constipation, diarrhea, nausea, or dizziness.     12/18/2024: Patient presents for a follow up for sickle cell disease. He continues on Hydrea 1000 mg BID, Ferriprox 2500 mg TID, folic acid, and Vitamin D. He continues with lower back pain, 6/10. He is taking Percocet 10 mg as needed for pain. 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 He has an appointment on 1/16/2025 with the sickle cell clinic on [place]. Denies any infection, headache, constipation, diarrhea, nausea, or dizziness.     11/6/2024: Patient is here today for a follow up for sickle cell disease. He continues on Hydrea 1000 mg BID, Ferripox 2500 mg TID. He stopped Oxbryta about 4 months ago. It was taken off the market. He continues  with back and leg pain that is controlled with Percocet 10 mg every four hours as needed for pain. He has an appointment on 11/14/24 with the sickle cell clinic in [place].     9/23/2024:  Mr Kumar is here today with his mother for follow-up and labs regarding sickle cell disease.  Today, Mr. Kumar reports he had a pain crisis triggered by URI on August 24, 2024.  He was treated in the ER in [place] with IV fluids and pain medication.  He was told he had a viral pneumonia.  He states he has not felt well since the 24th of August.  He is complaining of right-sided rib pain from coughing.  Lung sound RLL with crackles and diminished breath sounds.  All other lung fields WNL.  Pulse Ox is 99% on room air.  No SOB at this time.  He will contact his PCP today for an appointment regarding this complaint.  He reports compliance with Hydrea 500mg po BID, Oxbryta 1500 mg PO QD, Folic acid 1 mg PO QD, and Ferroprox 1000 mg PO TID.  He denies fever, chills, acute chest syndrome, chest pain, arm pain, confirms hip pain, denies leg ulcers, denies jaundice, and stroke-like and symptoms.   He confirms avascular necrosis of the hip.  He reports seeing ortho for avascular necrosis.  He saw pain management in [place] and was told to follow with hematology for pain management.  He takes Percocet 10/325 mg PO Q 4 hours prn for pain control of hips and when necessary pain crisis.  His weight is stable.  He is eating and drinking well.        5/22/224:Having pain in his hip from avascular necrosis and back. He is on maximum dose of percocet. Will refer to pain management. No crisis in a long time.   4/2024:  He returns to the clinic today for a follow up regarding his history of sickle cell disease. He also has worsened pain to left hip. He is followed by ortho regularly.  He continues to take his Hydrea, Ferriprox, and oxbryta as prescribed without any complications or concerns.  He denies any shortness of breath, chest  pain, nausea, diarrhea, constipation, easy bruising or bleeding.      PMX/PSHx  Past Medical History:   Diagnosis Date    Sickle-cell disease without crisis       Past Surgical History:   Procedure Laterality Date    CARDIAC CATHETERIZATION      CHOLECYSTECTOMY      ESOPHAGOGASTRODUODENOSCOPY      MEDIPORT INSERTION, SINGLE      TONSILLECTOMY       Allergies:  Review of patient's allergies indicates:   Allergen Reactions    Deferasirox Rash    Povidone-iodine Rash      Social History:   Social History     Tobacco Use    Smoking status: Never    Smokeless tobacco: Never   Substance Use Topics    Alcohol use: Not Currently    Drug use: Not Currently     Types: Marijuana     Medications:  Current Outpatient Medications   Medication Instructions    albuterol (PROVENTIL/VENTOLIN HFA) 90 mcg/actuation inhaler     amLODIPine (NORVASC) 10 mg, Daily    cyanocobalamin (vitamin B-12) 50 mcg, Every 7 days    cyclobenzaprine (FLEXERIL) 10 MG tablet 1 tablet, Daily    deferiprone (FERRIPROX) 1,000 mg Tab 2.5 tablets, Oral, 3 times daily    empagliflozin (JARDIANCE) 10 mg, Daily    ergocalciferol (ERGOCALCIFEROL) 50,000 unit Cap TAKE ONE (1) CAPSULE (50,000 UNITS TOTAL) BY MOUTH EVERY 7 DAYS.    finerenone 20 mg Tab 1 tablet    folic acid (FOLVITE) 1 mg, Daily    gabapentin (NEURONTIN) 300 MG capsule     hydroCHLOROthiazide (HYDRODIURIL) 12.5 MG Tab 1 tablet, Daily    hydroxyurea (HYDREA) 500 mg Cap TAKE TWO (2) CAPSULES BY MOUTH TWICE DAILY.    ibuprofen (ADVIL,MOTRIN) 800 MG tablet     loratadine (CLARITIN) 10 mg, Daily    losartan (COZAAR) 50 mg, Daily    metoprolol succinate (TOPROL-XL) 50 mg, Daily    OXBRYTA 1,500 mg, Oral, Daily    oxyCODONE-acetaminophen (PERCOCET)  mg per tablet 1 tablet, Oral, Every 4 hours PRN    tamsulosin (FLOMAX) 0.4 mg Cap 1 capsule, Nightly    TARPEYO 4 mg CpDR      ROS:  Review of Systems   Constitutional:  Positive for fatigue. Negative for appetite change, chills, fever and unexpected  weight change.   HENT:  Negative for ear discharge, facial swelling, mouth sores, nosebleeds, sinus pressure/congestion and sore throat.    Eyes:  Negative for pain and visual disturbance.   Respiratory:  Negative for cough, chest tightness, shortness of breath and wheezing.    Cardiovascular:  Negative for chest pain, palpitations and leg swelling.   Gastrointestinal:  Negative for abdominal pain, blood in stool, change in bowel habit, constipation, diarrhea, nausea and vomiting.   Endocrine: Negative.    Genitourinary:  Negative for dysuria, frequency, hematuria and urgency.   Musculoskeletal:  Positive for back pain and gait problem. Negative for arthralgias, joint swelling and myalgias.        Back pain, chronic   Integumentary:  Negative for color change, pallor, rash and wound.   Allergic/Immunologic: Negative.  Negative for frequent infections.   Neurological:  Positive for weakness. Negative for dizziness, vertigo, syncope, numbness and headaches.   Hematological:  Negative for adenopathy. Does not bruise/bleed easily.   Psychiatric/Behavioral:  Negative for confusion and dysphoric mood. The patient is not nervous/anxious.    All other systems reviewed and are negative.      Objective:   Vitals:  There were no vitals filed for this visit.         Wt Readings from Last 3 Encounters:   12/18/24 0959 88.9 kg (196 lb)   11/06/24 0948 86.6 kg (191 lb)   09/23/24 1342 81.3 kg (179 lb 3.2 oz)     Physical Exam  Vitals reviewed.   Constitutional:       General: He is not in acute distress.     Appearance: Normal appearance. He is normal weight.   HENT:      Head: Normocephalic and atraumatic.      Nose: Nose normal.      Mouth/Throat:      Mouth: Mucous membranes are moist.      Pharynx: Oropharynx is clear. No oropharyngeal exudate or posterior oropharyngeal erythema.   Eyes:      Extraocular Movements: Extraocular movements intact.      Conjunctiva/sclera: Conjunctivae normal.      Pupils: Pupils are equal, round,  and reactive to light.   Cardiovascular:      Rate and Rhythm: Normal rate and regular rhythm.      Pulses: Normal pulses.      Heart sounds: No murmur heard.     No friction rub. No gallop.   Pulmonary:      Effort: Pulmonary effort is normal. No respiratory distress.      Breath sounds: No wheezing, rhonchi or rales.   Abdominal:      General: Abdomen is flat. Bowel sounds are normal.      Palpations: Abdomen is soft. There is no mass.      Tenderness: There is no abdominal tenderness. There is no guarding.   Musculoskeletal:         General: No swelling, tenderness or deformity. Normal range of motion.      Cervical back: Normal range of motion and neck supple.      Right lower leg: No edema.      Left lower leg: No edema.   Lymphadenopathy:      Cervical: No cervical adenopathy.   Skin:     General: Skin is warm and dry.      Findings: No erythema, lesion or rash.   Neurological:      General: No focal deficit present.      Mental Status: He is alert and oriented to person, place, and time. Mental status is at baseline.      Cranial Nerves: No cranial nerve deficit.      Gait: Gait normal.   Psychiatric:         Mood and Affect: Mood normal.         Behavior: Behavior normal.         Thought Content: Thought content normal.         Judgment: Judgment normal.      Labs:  No visits with results within 1 Week(s) from this visit.   Latest known visit with results is:   Lab Visit on 12/18/2024   Component Date Value    Sodium 12/18/2024 141     Potassium 12/18/2024 4.0     Chloride 12/18/2024 111 (H)     CO2 12/18/2024 23     Glucose 12/18/2024 96     Blood Urea Nitrogen 12/18/2024 17.0     Creatinine 12/18/2024 1.29 (H)     Calcium 12/18/2024 9.3     Protein Total 12/18/2024 7.0     Albumin 12/18/2024 3.4 (L)     Globulin 12/18/2024 3.6 (H)     Albumin/Globulin Ratio 12/18/2024 0.9 (L)     Bilirubin Total 12/18/2024 1.2     ALP 12/18/2024 60     ALT 12/18/2024 28     AST 12/18/2024 24     eGFR 12/18/2024 >60      Anion Gap 12/18/2024 7.0     BUN/Creatinine Ratio 12/18/2024 13     Ferritin Level 12/18/2024 3,035.78 (H)     Vitamin D 12/18/2024 36     WBC 12/18/2024 9.45     RBC 12/18/2024 2.17 (L)     Hgb 12/18/2024 11.4 (L)     Hct 12/18/2024 30.7 (L)     MCV 12/18/2024 141.5 (H)     MCH 12/18/2024 52.5 (H)     MCHC 12/18/2024 37.1 (H)     RDW 12/18/2024 14.7     Platelet 12/18/2024 344     MPV 12/18/2024 10.2     Neut % 12/18/2024 43.4     Lymph % 12/18/2024 48.3     Mono % 12/18/2024 6.9     Eos % 12/18/2024 0.6     Basophil % 12/18/2024 0.5     Lymph # 12/18/2024 4.56     Neut # 12/18/2024 4.10     Mono # 12/18/2024 0.65     Eos # 12/18/2024 0.06     Baso # 12/18/2024 0.05     Imm Gran # 12/18/2024 0.03     Imm Grans % 12/18/2024 0.3      11/1/22- wbc 8.84, hgb 12.7, plt 321, cr 1.05, AST 32, ALT 28 Ferritin 4839  5/16/22- WBC 9.10, Hgb 9.6, .1, Plt 297, Cr 0.93, AST 37, ALT 37, Ferritin 4457     Pathology:      Radiology/Diagnostics:    MRI of the abdomen without contrast 2/3/2022:   Liver is borderline prominent with right lobe measuring 16.2 cm. Liver remains of low signal intensity on all imaging sequences consistent with hemochromatosis. Slightly higher signal in the out of phase images relative to the in phase sequence, again compatible with hemochromatosis. The spleen is small and calcified. Pancreas unremarkable. Bilateral renal cysts with dominant 2.9 cm right interpolar cyst. Impression: Liver MRI findings compatible with hemochromatosis. Borderline liver size.     I have reviewed all available lab results and radiology reports.  Assessment:   Sickle Cell Disease   Cont Hydrea 1000mg BID, Ferriprox 2500 mg 3 times daily, oxbryta 1500 mg daily. Cont folic acid.   EYE EXAM - He did see Dr. Raza, OD in [place] recently   Echo done 2/24/21, no evidence of pulm htn.   Iron overload   Cont Ferriprox 2500mg TID - encouraged better compliance. If improved compliance does not improve ferritin, will need  to consider alternate chelator - MRI of liver showed iron deposition 2/2022.   recommend transfusing 1 unit only for symptomatic anemia and then reassessing prior to giving more blood. In addition to continued risk of iron overload with transfusion, he is at risk for alloimmunization with additional transfusions as well.   Ferritin stable   Vitamin D Deficiency   Vitamin-D improving   Osteopenia   Cont Ca + Vit D.   Dr. Mcbride stopped Fosamax.   Proteinuria   seeing Dr. Lira regularly   Continue follow-ups with Nephrology.   Low Back Pain  Worsened after COVID infection.  He was instructed to contact orthopedic for more physical therapy   We have been refilling Oxycodone monthly    Left Hip Pain   better with regular exercise.  Followed by orthopedics, Dr. Mcbride regularly.  Elevated LFTs  Most likely related to current tamiflu use         Plan:   Declines any further appt with  S[facility] in [place]  Continue Percocet 10/325 mg PO Q 4 hours prn for chronic pain. Refill sent  Continue to follow with ortho for avascular necrosis.  Cont Hydrea 1000 mg bid   Cont Ferriprox 2500 mg TID  Vitamin-D Continue 73436 units weekly.    Continue folic acid.   Continue to follow with nephrology for kidney issues (cyst and mass per patient).  Lab only in four weeks  RTC In 12 weeks with NP for FU/lab  CBC, CMP,  Ferritin, Vit D, folate  Encouraged to call for any questions or problems.      Providers:  PCP: Jarett Paulino MD   Ortho: Dr. Mcbride ([place])   Renal: Dr. Lira ([place])          The patient is in agreement with today's plan of care.  All questions answered.  Patient is aware to contact our office for any problems or concerns prior to next visit.      Rebecca Sandoval, JUAN-C  Oncology/Hematology  [facility]

## 2025-01-30 ENCOUNTER — LAB VISIT (OUTPATIENT)
Dept: LAB | Facility: HOSPITAL | Age: 40
End: 2025-01-30
Payer: MEDICARE

## 2025-01-30 ENCOUNTER — OFFICE VISIT (OUTPATIENT)
Dept: HEMATOLOGY/ONCOLOGY | Facility: CLINIC | Age: 40
End: 2025-01-30
Payer: MEDICARE

## 2025-01-30 VITALS
RESPIRATION RATE: 18 BRPM | TEMPERATURE: 99 F | HEART RATE: 79 BPM | BODY MASS INDEX: 31.48 KG/M2 | DIASTOLIC BLOOD PRESSURE: 86 MMHG | OXYGEN SATURATION: 94 % | WEIGHT: 195.88 LBS | HEIGHT: 66 IN | SYSTOLIC BLOOD PRESSURE: 132 MMHG

## 2025-01-30 DIAGNOSIS — E55.9 VITAMIN D DEFICIENCY, UNSPECIFIED: ICD-10-CM

## 2025-01-30 DIAGNOSIS — D84.821 DRUG-INDUCED IMMUNODEFICIENCY: ICD-10-CM

## 2025-01-30 DIAGNOSIS — E55.9 VITAMIN D DEFICIENCY: ICD-10-CM

## 2025-01-30 DIAGNOSIS — D57.00 SICKLE CELL DISEASE WITH CRISIS: Primary | ICD-10-CM

## 2025-01-30 DIAGNOSIS — Z79.64 ON HYDROXYUREA THERAPY: ICD-10-CM

## 2025-01-30 DIAGNOSIS — M87.00 AVASCULAR NECROSIS: ICD-10-CM

## 2025-01-30 DIAGNOSIS — Z79.64 ENCOUNTER FOR MONITORING OF HYDROXYUREA THERAPY: ICD-10-CM

## 2025-01-30 DIAGNOSIS — E83.19 IRON OVERLOAD: ICD-10-CM

## 2025-01-30 DIAGNOSIS — Z51.81 ENCOUNTER FOR MONITORING OF HYDROXYUREA THERAPY: ICD-10-CM

## 2025-01-30 DIAGNOSIS — M87.059 AVASCULAR NECROSIS OF BONE OF HIP, UNSPECIFIED LATERALITY: ICD-10-CM

## 2025-01-30 DIAGNOSIS — D57.00 SICKLE CELL DISEASE WITH CRISIS: ICD-10-CM

## 2025-01-30 DIAGNOSIS — E53.8 FOLIC ACID DEFICIENCY: ICD-10-CM

## 2025-01-30 DIAGNOSIS — G89.29 OTHER CHRONIC PAIN: ICD-10-CM

## 2025-01-30 DIAGNOSIS — Z79.899 DRUG-INDUCED IMMUNODEFICIENCY: ICD-10-CM

## 2025-01-30 DIAGNOSIS — M87.9 HIP PAIN WITH OSTEONECROSIS: ICD-10-CM

## 2025-01-30 LAB
ABS NEUT CALC (OHS): 1.59 X10(3)/MCL (ref 2.1–9.2)
ALBUMIN SERPL-MCNC: 2.9 G/DL (ref 3.5–5)
ALBUMIN/GLOB SERPL: 0.8 RATIO (ref 1.1–2)
ALP SERPL-CCNC: 51 UNIT/L (ref 40–150)
ALT SERPL-CCNC: 79 UNIT/L (ref 0–55)
ANION GAP SERPL CALC-SCNC: 7 MEQ/L
AST SERPL-CCNC: 61 UNIT/L (ref 5–34)
BILIRUB SERPL-MCNC: 1.2 MG/DL
BUN SERPL-MCNC: 19 MG/DL (ref 8.9–20.6)
CALCIUM SERPL-MCNC: 8.7 MG/DL (ref 8.4–10.2)
CHLORIDE SERPL-SCNC: 114 MMOL/L (ref 98–107)
CO2 SERPL-SCNC: 23 MMOL/L (ref 22–29)
CREAT SERPL-MCNC: 1.14 MG/DL (ref 0.72–1.25)
CREAT/UREA NIT SERPL: 17
EOSINOPHIL NFR BLD MANUAL: 0.06 X10(3)/MCL (ref 0–0.9)
EOSINOPHIL NFR BLD MANUAL: 1 % (ref 0–8)
ERYTHROCYTE [DISTWIDTH] IN BLOOD BY AUTOMATED COUNT: 15 % (ref 11.5–17)
FERRITIN SERPL-MCNC: 9005.72 NG/ML (ref 21.81–274.66)
GFR SERPLBLD CREATININE-BSD FMLA CKD-EPI: >60 ML/MIN/1.73/M2
GLOBULIN SER-MCNC: 3.8 GM/DL (ref 2.4–3.5)
GLUCOSE SERPL-MCNC: 93 MG/DL (ref 74–100)
HCT VFR BLD AUTO: 28.1 % (ref 42–52)
HGB BLD-MCNC: 10.3 G/DL (ref 14–18)
HYPOCHROMIA BLD QL SMEAR: SLIGHT
LYMPHOCYTES NFR BLD MANUAL: 4.04 X10(3)/MCL
LYMPHOCYTES NFR BLD MANUAL: 66 % (ref 13–40)
MACROCYTES BLD QL SMEAR: ABNORMAL
MCH RBC QN AUTO: 49.5 PG (ref 27–31)
MCHC RBC AUTO-ENTMCNC: 36.7 G/DL (ref 33–36)
MCV RBC AUTO: 135.1 FL (ref 80–94)
MICROCYTES BLD QL SMEAR: SLIGHT
MONOCYTES NFR BLD MANUAL: 0.43 X10(3)/MCL (ref 0.1–1.3)
MONOCYTES NFR BLD MANUAL: 7 % (ref 2–11)
NEUTROPHILS NFR BLD MANUAL: 26 % (ref 47–80)
NRBC BLD MANUAL-RTO: 38 %
OVALOCYTES (OLG): ABNORMAL
PLATELET # BLD AUTO: 230 X10(3)/MCL (ref 130–400)
PLATELET # BLD EST: ADEQUATE 10*3/UL
PMV BLD AUTO: 10.2 FL (ref 7.4–10.4)
POIKILOCYTOSIS BLD QL SMEAR: ABNORMAL
POTASSIUM SERPL-SCNC: 3.9 MMOL/L (ref 3.5–5.1)
PROT SERPL-MCNC: 6.7 GM/DL (ref 6.4–8.3)
RBC # BLD AUTO: 2.08 X10(6)/MCL (ref 4.7–6.1)
RBC MORPH BLD: ABNORMAL
SODIUM SERPL-SCNC: 144 MMOL/L (ref 136–145)
TARGETS BLD QL SMEAR: ABNORMAL
WBC # BLD AUTO: 6.12 X10(3)/MCL (ref 4.5–11.5)

## 2025-01-30 PROCEDURE — 82728 ASSAY OF FERRITIN: CPT

## 2025-01-30 PROCEDURE — 99999 PR PBB SHADOW E&M-EST. PATIENT-LVL V: CPT | Mod: PBBFAC,,,

## 2025-01-30 PROCEDURE — 99215 OFFICE O/P EST HI 40 MIN: CPT | Mod: PBBFAC

## 2025-01-30 PROCEDURE — 36415 COLL VENOUS BLD VENIPUNCTURE: CPT

## 2025-01-30 PROCEDURE — 85025 COMPLETE CBC W/AUTO DIFF WBC: CPT

## 2025-01-30 PROCEDURE — 80053 COMPREHEN METABOLIC PANEL: CPT

## 2025-01-30 RX ORDER — HYDROXYUREA 500 MG/1
CAPSULE ORAL
Qty: 120 CAPSULE | Refills: 3 | Status: SHIPPED | OUTPATIENT
Start: 2025-01-30

## 2025-01-30 RX ORDER — OSELTAMIVIR PHOSPHATE 75 MG/1
CAPSULE ORAL
COMMUNITY
Start: 2025-01-28

## 2025-01-30 RX ORDER — ERGOCALCIFEROL 1.25 MG/1
50000 CAPSULE ORAL
Qty: 30 CAPSULE | Refills: 2 | Status: SHIPPED | OUTPATIENT
Start: 2025-01-30

## 2025-01-30 RX ORDER — FOLIC ACID 1 MG/1
1 TABLET ORAL DAILY
Qty: 30 TABLET | Refills: 6 | Status: SHIPPED | OUTPATIENT
Start: 2025-01-30

## 2025-01-30 RX ORDER — DEFERIPRONE 1000 MG/1
2.5 TABLET ORAL 3 TIMES DAILY
Qty: 225 TABLET | Refills: 6 | Status: SHIPPED | OUTPATIENT
Start: 2025-01-30

## 2025-02-21 DIAGNOSIS — M87.9 HIP PAIN WITH OSTEONECROSIS: ICD-10-CM

## 2025-02-21 DIAGNOSIS — D57.00 SICKLE CELL DISEASE WITH CRISIS: ICD-10-CM

## 2025-02-21 RX ORDER — OXYCODONE AND ACETAMINOPHEN 10; 325 MG/1; MG/1
1 TABLET ORAL EVERY 4 HOURS PRN
Qty: 120 TABLET | Refills: 0 | Status: SHIPPED | OUTPATIENT
Start: 2025-02-21

## 2025-03-13 ENCOUNTER — LAB VISIT (OUTPATIENT)
Dept: LAB | Facility: HOSPITAL | Age: 40
End: 2025-03-13
Payer: MEDICARE

## 2025-03-13 DIAGNOSIS — D57.00 SICKLE CELL DISEASE WITH CRISIS: ICD-10-CM

## 2025-03-13 LAB
ALBUMIN SERPL-MCNC: 3.4 G/DL (ref 3.5–5)
ALBUMIN/GLOB SERPL: 0.9 RATIO (ref 1.1–2)
ALP SERPL-CCNC: 55 UNIT/L (ref 40–150)
ALT SERPL-CCNC: 32 UNIT/L (ref 0–55)
ANION GAP SERPL CALC-SCNC: 8 MEQ/L
AST SERPL-CCNC: 26 UNIT/L (ref 5–34)
BASOPHILS # BLD AUTO: 0.04 X10(3)/MCL
BASOPHILS NFR BLD AUTO: 0.5 %
BILIRUB SERPL-MCNC: 1.6 MG/DL
BUN SERPL-MCNC: 23 MG/DL (ref 8.9–20.6)
CALCIUM SERPL-MCNC: 8.9 MG/DL (ref 8.4–10.2)
CHLORIDE SERPL-SCNC: 111 MMOL/L (ref 98–107)
CO2 SERPL-SCNC: 22 MMOL/L (ref 22–29)
CREAT SERPL-MCNC: 1.11 MG/DL (ref 0.72–1.25)
CREAT/UREA NIT SERPL: 21
EOSINOPHIL # BLD AUTO: 0.17 X10(3)/MCL (ref 0–0.9)
EOSINOPHIL NFR BLD AUTO: 2.1 %
ERYTHROCYTE [DISTWIDTH] IN BLOOD BY AUTOMATED COUNT: 16.6 % (ref 11.5–17)
FERRITIN SERPL-MCNC: 4155.85 NG/ML (ref 21.81–274.66)
GFR SERPLBLD CREATININE-BSD FMLA CKD-EPI: >60 ML/MIN/1.73/M2
GLOBULIN SER-MCNC: 3.6 GM/DL (ref 2.4–3.5)
GLUCOSE SERPL-MCNC: 95 MG/DL (ref 74–100)
HCT VFR BLD AUTO: 28.2 % (ref 42–52)
HGB BLD-MCNC: 10.4 G/DL (ref 14–18)
IMM GRANULOCYTES # BLD AUTO: 0.04 X10(3)/MCL (ref 0–0.04)
IMM GRANULOCYTES NFR BLD AUTO: 0.5 %
LYMPHOCYTES # BLD AUTO: 4.33 X10(3)/MCL (ref 0.6–4.6)
LYMPHOCYTES NFR BLD AUTO: 54.4 %
MACROCYTES BLD QL SMEAR: ABNORMAL
MCH RBC QN AUTO: 50.5 PG (ref 27–31)
MCHC RBC AUTO-ENTMCNC: 36.9 G/DL (ref 33–36)
MCV RBC AUTO: 136.9 FL (ref 80–94)
MONOCYTES # BLD AUTO: 0.67 X10(3)/MCL (ref 0.1–1.3)
MONOCYTES NFR BLD AUTO: 8.4 %
NEUTROPHILS # BLD AUTO: 2.71 X10(3)/MCL (ref 2.1–9.2)
NEUTROPHILS NFR BLD AUTO: 34.1 %
NRBC BLD AUTO-RTO: 63.1 %
PLATELET # BLD AUTO: 312 X10(3)/MCL (ref 130–400)
PLATELET # BLD EST: ADEQUATE 10*3/UL
PMV BLD AUTO: 10.6 FL (ref 7.4–10.4)
POTASSIUM SERPL-SCNC: 3.9 MMOL/L (ref 3.5–5.1)
PROT SERPL-MCNC: 7 GM/DL (ref 6.4–8.3)
RBC # BLD AUTO: 2.06 X10(6)/MCL (ref 4.7–6.1)
RBC MORPH BLD: ABNORMAL
SODIUM SERPL-SCNC: 141 MMOL/L (ref 136–145)
WBC # BLD AUTO: 7.96 X10(3)/MCL (ref 4.5–11.5)

## 2025-03-13 PROCEDURE — 80053 COMPREHEN METABOLIC PANEL: CPT

## 2025-03-13 PROCEDURE — 36415 COLL VENOUS BLD VENIPUNCTURE: CPT

## 2025-03-13 PROCEDURE — 85025 COMPLETE CBC W/AUTO DIFF WBC: CPT

## 2025-03-13 PROCEDURE — 82728 ASSAY OF FERRITIN: CPT

## 2025-03-20 DIAGNOSIS — D57.00 SICKLE CELL DISEASE WITH CRISIS: ICD-10-CM

## 2025-03-20 DIAGNOSIS — M87.9 HIP PAIN WITH OSTEONECROSIS: ICD-10-CM

## 2025-03-20 RX ORDER — OXYCODONE AND ACETAMINOPHEN 10; 325 MG/1; MG/1
1 TABLET ORAL EVERY 4 HOURS PRN
Qty: 120 TABLET | Refills: 0 | Status: SHIPPED | OUTPATIENT
Start: 2025-03-20

## 2025-04-16 DIAGNOSIS — M87.9 HIP PAIN WITH OSTEONECROSIS: ICD-10-CM

## 2025-04-16 DIAGNOSIS — D57.00 SICKLE CELL DISEASE WITH CRISIS: ICD-10-CM

## 2025-04-16 RX ORDER — OXYCODONE AND ACETAMINOPHEN 10; 325 MG/1; MG/1
1 TABLET ORAL EVERY 6 HOURS PRN
Qty: 120 TABLET | Refills: 0 | Status: SHIPPED | OUTPATIENT
Start: 2025-04-16

## 2025-05-07 NOTE — PROGRESS NOTES
[facility] Hematology/Oncology  PROGRESS NOTE    Subjective:       Patient ID: Melchor Kumar is a 39 y.o. male.    Diagnosis:  Sickle Cell Disease  Hyperferritenemia     Current Treatment:  Hydrea 1000mg BID  Ferriprox 2500mg TID    Treatment History:  Jadenu (stopped due to rash)  Exjade (stopped due to rash)  Oxbryta 1500 mg daily stopped 7/2024 -taken off market     Chief Complaint: Sickle cell disease with crisis (Pt reports leg pain is a 6 out of 10 today. )       HPI:  Diagnosis/Treatment History:   Sickle Cell Disease   - Hydrea 500mg po BID   - Folic acid  - Oxbryta   - ECHO 5/6/16   - Ophthalmology 1/31/18   Iron overload 2/2 transfusions   - Rash with exjade   - Ferriprox - started mid 7/2016   Vitamin D Deficiency     Interval History:  5/8/2025: He returns to the clinic today for a follow-up for sickle cell disease. He reports doing well today. He has not had any sickle cell crisis. He continues on Hydrea 1000 mg BID, Ferriprox 2500 mg TID, folic acid, and Vitamin D. He continues with lower back pain that is stable and controlled. He is taking Percocet 10 mg as needed for pain. Denies any headache, constipation, diarrhea, nausea, or dizziness.     1/30/2025: He returns to the clinic today for a follow-up for sickle cell disease. He was seen by the sickle cell clinic in [place] on 1/16/2025 and does not want to return due to the distance. He continues on Hydrea 1000 mg BID, Ferriprox 2500 mg TID, folic acid, and Vitamin D. He continues with lower back pain that is stable and controlled. He is taking Percocet 10 mg as needed for pain. He currently has the flu and is on tamiflu for this. Denies any headache, constipation, diarrhea, nausea, or dizziness.     12/18/2024: Patient presents for a follow up for sickle cell disease. He continues on Hydrea 1000 mg BID, Ferriprox 2500 mg TID, folic acid, and Vitamin D. He continues with lower back pain, 6/10. He is taking Percocet 10 mg as needed for pain.  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 He has an appointment on 1/16/2025 with the sickle cell clinic on [place]. Denies any infection, headache, constipation, diarrhea, nausea, or dizziness.     11/6/2024: Patient is here today for a follow up for sickle cell disease. He continues on Hydrea 1000 mg BID, Ferripox 2500 mg TID. He stopped Oxbryta about 4 months ago. It was taken off the market. He continues with back and leg pain that is controlled with Percocet 10 mg every four hours as needed for pain. He has an appointment on 11/14/24 with the sickle cell clinic in [place].     9/23/2024:  Mr Kumar is here today with his mother for follow-up and labs regarding sickle cell disease.  Today, Mr. Kumar reports he had a pain crisis triggered by URI on August 24, 2024.  He was treated in the ER in [place] with IV fluids and pain medication.  He was told he had a viral pneumonia.  He states he has not felt well since the 24th of August.  He is complaining of right-sided rib pain from coughing.  Lung sound RLL with crackles and diminished breath sounds.  All other lung fields WNL.  Pulse Ox is 99% on room air.  No SOB at this time.  He will contact his PCP today for an appointment regarding this complaint.  He reports compliance with Hydrea 500mg po BID, Oxbryta 1500 mg PO QD, Folic acid 1 mg PO QD, and Ferroprox 1000 mg PO TID.  He denies fever, chills, acute chest syndrome, chest pain, arm pain, confirms hip pain, denies leg ulcers, denies jaundice, and stroke-like and symptoms.   He confirms avascular necrosis of the hip.  He reports seeing ortho for avascular necrosis.  He saw pain management in [place] and was told to follow with hematology for pain management.  He takes Percocet 10/325 mg PO Q 4 hours prn for pain control of hips and when necessary pain crisis.  His weight is stable.  He is eating and drinking well.      5/22/24:Having pain in his  hip from avascular necrosis and back. He is on maximum dose of percocet. Will refer to pain management. No crisis in a long time.   4/2024:  He returns to the clinic today for a follow up regarding his history of sickle cell disease. He also has worsened pain to left hip. He is followed by ortho regularly.  He continues to take his Hydrea, Ferriprox, and oxbryta as prescribed without any complications or concerns.  He denies any shortness of breath, chest pain, nausea, diarrhea, constipation, easy bruising or bleeding.      PMX/PSHx  Past Medical History:   Diagnosis Date    Sickle-cell disease without crisis       Past Surgical History:   Procedure Laterality Date    CARDIAC CATHETERIZATION      CHOLECYSTECTOMY      ESOPHAGOGASTRODUODENOSCOPY      MEDIPORT INSERTION, SINGLE      TONSILLECTOMY       Allergies:  Review of patient's allergies indicates:   Allergen Reactions    Deferasirox Rash    Povidone-iodine Rash      Social History:   Social History     Tobacco Use    Smoking status: Never    Smokeless tobacco: Never   Substance Use Topics    Alcohol use: Not Currently    Drug use: Not Currently     Types: Marijuana     Medications:  Current Outpatient Medications   Medication Instructions    albuterol (PROVENTIL/VENTOLIN HFA) 90 mcg/actuation inhaler     amLODIPine (NORVASC) 10 mg, Daily    cyanocobalamin (vitamin B-12) 50 mcg, Every 7 days    cyclobenzaprine (FLEXERIL) 10 MG tablet 1 tablet, Daily    deferiprone (FERRIPROX) 1,000 mg Tab 2.5 tablets, Oral, 3 times daily    empagliflozin (JARDIANCE) 10 mg, Daily    ergocalciferol (ERGOCALCIFEROL) 50,000 Units, Oral, Every 7 days    finerenone 20 mg Tab 1 tablet    folic acid (FOLVITE) 1 mg, Oral, Daily    gabapentin (NEURONTIN) 300 MG capsule     hydroCHLOROthiazide (HYDRODIURIL) 12.5 MG Tab 1 tablet, Daily    hydroxyurea (HYDREA) 500 mg Cap TAKE TWO (2) CAPSULES BY MOUTH TWICE DAILY    ibuprofen (ADVIL,MOTRIN) 800 MG tablet     loratadine (CLARITIN) 10 mg, Daily     losartan (COZAAR) 50 mg, Daily    metoprolol succinate (TOPROL-XL) 50 mg, Daily    oseltamivir (TAMIFLU) 75 MG capsule     OXBRYTA 1,500 mg, Oral, Daily    oxyCODONE-acetaminophen (PERCOCET)  mg per tablet 1 tablet, Oral, Every 6 hours PRN    tamsulosin (FLOMAX) 0.4 mg Cap 1 capsule, Nightly    TARPEYO 4 mg CpDR      ROS:  Review of Systems   Constitutional:  Positive for fatigue. Negative for appetite change, chills, fever and unexpected weight change.   HENT:  Negative for ear discharge, facial swelling, mouth sores, nosebleeds, sinus pressure/congestion and sore throat.    Eyes:  Negative for pain and visual disturbance.   Respiratory:  Negative for cough, chest tightness, shortness of breath and wheezing.    Cardiovascular:  Negative for chest pain, palpitations and leg swelling.   Gastrointestinal:  Negative for abdominal pain, blood in stool, change in bowel habit, constipation, diarrhea, nausea and vomiting.   Endocrine: Negative.    Genitourinary:  Negative for dysuria, frequency, hematuria and urgency.   Musculoskeletal:  Positive for back pain and gait problem. Negative for arthralgias, joint swelling and myalgias.        Back pain, chronic   Integumentary:  Negative for color change, pallor, rash and wound.   Allergic/Immunologic: Negative.  Negative for frequent infections.   Neurological:  Positive for weakness. Negative for dizziness, vertigo, syncope, numbness and headaches.   Hematological:  Negative for adenopathy. Does not bruise/bleed easily.   Psychiatric/Behavioral:  Negative for confusion and dysphoric mood. The patient is not nervous/anxious.    All other systems reviewed and are negative.      Objective:   Vitals:  Vitals:    05/08/25 0937   BP: 134/80   Pulse: 87   Resp: 16   Temp: 98.4 °F (36.9 °C)            Wt Readings from Last 3 Encounters:   05/08/25 0937 92.9 kg (204 lb 11.2 oz)   01/30/25 0858 88.9 kg (195 lb 14.4 oz)   12/18/24 0959 88.9 kg (196 lb)     Physical  Exam  Vitals reviewed.   Constitutional:       General: He is not in acute distress.     Appearance: Normal appearance. He is normal weight.   HENT:      Head: Normocephalic and atraumatic.      Nose: Nose normal.      Mouth/Throat:      Mouth: Mucous membranes are moist.      Pharynx: Oropharynx is clear. No oropharyngeal exudate or posterior oropharyngeal erythema.   Eyes:      Extraocular Movements: Extraocular movements intact.      Conjunctiva/sclera: Conjunctivae normal.      Pupils: Pupils are equal, round, and reactive to light.   Cardiovascular:      Rate and Rhythm: Normal rate and regular rhythm.      Pulses: Normal pulses.      Heart sounds: No murmur heard.     No friction rub. No gallop.   Pulmonary:      Effort: Pulmonary effort is normal. No respiratory distress.      Breath sounds: No wheezing, rhonchi or rales.   Abdominal:      General: Abdomen is flat. Bowel sounds are normal.      Palpations: Abdomen is soft. There is no mass.      Tenderness: There is no abdominal tenderness. There is no guarding.   Musculoskeletal:         General: No swelling, tenderness or deformity. Normal range of motion.      Cervical back: Normal range of motion and neck supple.      Right lower leg: No edema.      Left lower leg: No edema.   Lymphadenopathy:      Cervical: No cervical adenopathy.   Skin:     General: Skin is warm and dry.      Findings: No erythema, lesion or rash.   Neurological:      General: No focal deficit present.      Mental Status: He is alert and oriented to person, place, and time. Mental status is at baseline.      Cranial Nerves: No cranial nerve deficit.      Gait: Gait normal.   Psychiatric:         Mood and Affect: Mood normal.         Behavior: Behavior normal.         Thought Content: Thought content normal.         Judgment: Judgment normal.        Labs:  Lab Visit on 05/08/2025   Component Date Value    Sodium 05/08/2025 139     Potassium 05/08/2025 4.0     Chloride 05/08/2025 107      CO2 05/08/2025 26     Glucose 05/08/2025 133 (H)     Blood Urea Nitrogen 05/08/2025 20.0     Creatinine 05/08/2025 1.31 (H)     Calcium 05/08/2025 9.1     Protein Total 05/08/2025 7.0     Albumin 05/08/2025 3.3 (L)     Globulin 05/08/2025 3.7 (H)     Albumin/Globulin Ratio 05/08/2025 0.9 (L)     Bilirubin Total 05/08/2025 1.6 (H)     ALP 05/08/2025 63     ALT 05/08/2025 32     AST 05/08/2025 29     eGFR 05/08/2025 >60     Anion Gap 05/08/2025 6.0     BUN/Creatinine Ratio 05/08/2025 15     Ferritin Level 05/08/2025 4,406.78 (H)     WBC 05/08/2025 12.56 (H)     RBC 05/08/2025 2.30 (L)     Hgb 05/08/2025 11.6 (L)     Hct 05/08/2025 31.4 (L)     MCV 05/08/2025 136.5 (H)     MCH 05/08/2025 50.4 (H)     MCHC 05/08/2025 36.9 (H)     RDW 05/08/2025 15.3     Platelet 05/08/2025 311     MPV 05/08/2025 10.0     Neutrophils % 05/08/2025 54     Lymphs % 05/08/2025 38     Monocytes % 05/08/2025 8     nRBC % 05/08/2025 240     Neutrophils Abs Calc 05/08/2025 6.7824     Lymphs Abs 05/08/2025 4.7728 (H)     Monocytes Abs 05/08/2025 1.0048     Platelets 05/08/2025 Adequate     Macrocytosis 05/08/2025 2+ (A)      11/1/22- wbc 8.84, hgb 12.7, plt 321, cr 1.05, AST 32, ALT 28 Ferritin 4839  5/16/22- WBC 9.10, Hgb 9.6, .1, Plt 297, Cr 0.93, AST 37, ALT 37, Ferritin 4457     Pathology:      Radiology/Diagnostics:    MRI of the abdomen without contrast 2/3/2022:   Liver is borderline prominent with right lobe measuring 16.2 cm. Liver remains of low signal intensity on all imaging sequences consistent with hemochromatosis. Slightly higher signal in the out of phase images relative to the in phase sequence, again compatible with hemochromatosis. The spleen is small and calcified. Pancreas unremarkable. Bilateral renal cysts with dominant 2.9 cm right interpolar cyst. Impression: Liver MRI findings compatible with hemochromatosis. Borderline liver size.     I have reviewed all available lab results and radiology reports.  Assessment:    Sickle Cell Disease   Cont Hydrea 1000mg BID, Ferriprox 2500 mg 3 times daily, oxbryta 1500 mg daily. Cont folic acid.   EYE EXAM - He did see Dr. Raza, OD in [place] recently   Echo done 2/24/21, no evidence of pulm htn.   Iron overload   Cont Ferriprox 2500mg TID - encouraged better compliance. If improved compliance does not improve ferritin, will need to consider alternate chelator - MRI of liver showed iron deposition 2/2022.   recommend transfusing 1 unit only for symptomatic anemia and then reassessing prior to giving more blood. In addition to continued risk of iron overload with transfusion, he is at risk for alloimmunization with additional transfusions as well.   Ferritin stable   Vitamin D Deficiency   Vitamin-D improving   Osteopenia   Cont Ca + Vit D.   Dr. Mcbride stopped Fosamax.   Proteinuria   seeing Dr. Lira regularly   Continue follow-ups with Nephrology.   Low Back Pain  Worsened after COVID infection.  He was instructed to contact orthopedic for more physical therapy   We have been refilling Oxycodone monthly    Left Hip Pain   better with regular exercise.  Followed by orthopedics, Dr. Mcbride regularly.  Elevated LFTs  Most likely related to current tamiflu use         Plan:   Labs and exam stable.   Continue Percocet 10/325 mg PO Q 4 hours prn for chronic pain.   Continue to follow with ortho for avascular necrosis.  Cont Hydrea 1000 mg bid   Cont Ferriprox 2500 mg TID  Vitamin-D Continue 98207 units weekly.    Continue folic acid.   Continue to follow with nephrology for kidney issues (cyst and mass per patient).  RTC In 12 weeks with NP for FU/lab  CBC, CMP,  Ferritin, Vit D, folate  Encouraged to call for any questions or problems.      Providers:  PCP: Jarett Paulino MD   Ortho: Dr. Mcbride ([place])   Renal: Dr. Lira ([place])          The patient is in agreement with today's plan of care.  All questions answered.  Patient is aware to contact our office for any  problems or concerns prior to next visit.      Jenna Sarmiento, FNP-C  Oncology/Hematology   [facility]

## 2025-05-08 ENCOUNTER — OFFICE VISIT (OUTPATIENT)
Facility: CLINIC | Age: 40
End: 2025-05-08
Payer: MEDICARE

## 2025-05-08 ENCOUNTER — LAB VISIT (OUTPATIENT)
Dept: LAB | Facility: HOSPITAL | Age: 40
End: 2025-05-08
Payer: MEDICARE

## 2025-05-08 VITALS
OXYGEN SATURATION: 96 % | DIASTOLIC BLOOD PRESSURE: 80 MMHG | HEIGHT: 66 IN | RESPIRATION RATE: 16 BRPM | SYSTOLIC BLOOD PRESSURE: 134 MMHG | WEIGHT: 204.69 LBS | TEMPERATURE: 98 F | HEART RATE: 87 BPM | BODY MASS INDEX: 32.9 KG/M2

## 2025-05-08 DIAGNOSIS — G89.29 OTHER CHRONIC PAIN: ICD-10-CM

## 2025-05-08 DIAGNOSIS — Z79.64 ON HYDROXYUREA THERAPY: ICD-10-CM

## 2025-05-08 DIAGNOSIS — D57.00 SICKLE CELL DISEASE WITH CRISIS: Primary | ICD-10-CM

## 2025-05-08 DIAGNOSIS — E55.9 VITAMIN D DEFICIENCY, UNSPECIFIED: ICD-10-CM

## 2025-05-08 DIAGNOSIS — M87.00 AVASCULAR NECROSIS: ICD-10-CM

## 2025-05-08 DIAGNOSIS — E55.9 VITAMIN D DEFICIENCY: ICD-10-CM

## 2025-05-08 DIAGNOSIS — D57.00 SICKLE CELL DISEASE WITH CRISIS: ICD-10-CM

## 2025-05-08 DIAGNOSIS — Z51.81 ENCOUNTER FOR MONITORING OF HYDROXYUREA THERAPY: ICD-10-CM

## 2025-05-08 DIAGNOSIS — E83.19 IRON OVERLOAD: ICD-10-CM

## 2025-05-08 DIAGNOSIS — E53.8 FOLIC ACID DEFICIENCY: ICD-10-CM

## 2025-05-08 DIAGNOSIS — M87.059 AVASCULAR NECROSIS OF BONE OF HIP, UNSPECIFIED LATERALITY: ICD-10-CM

## 2025-05-08 DIAGNOSIS — Z79.64 ENCOUNTER FOR MONITORING OF HYDROXYUREA THERAPY: ICD-10-CM

## 2025-05-08 LAB
ABS NEUT CALC (OHS): 6.78 X10(3)/MCL (ref 2.1–9.2)
ALBUMIN SERPL-MCNC: 3.3 G/DL (ref 3.5–5)
ALBUMIN/GLOB SERPL: 0.9 RATIO (ref 1.1–2)
ALP SERPL-CCNC: 63 UNIT/L (ref 40–150)
ALT SERPL-CCNC: 32 UNIT/L (ref 0–55)
ANION GAP SERPL CALC-SCNC: 6 MEQ/L
AST SERPL-CCNC: 29 UNIT/L (ref 11–45)
BILIRUB SERPL-MCNC: 1.6 MG/DL
BUN SERPL-MCNC: 20 MG/DL (ref 8.9–20.6)
CALCIUM SERPL-MCNC: 9.1 MG/DL (ref 8.4–10.2)
CHLORIDE SERPL-SCNC: 107 MMOL/L (ref 98–107)
CO2 SERPL-SCNC: 26 MMOL/L (ref 22–29)
CREAT SERPL-MCNC: 1.31 MG/DL (ref 0.72–1.25)
CREAT/UREA NIT SERPL: 15
ERYTHROCYTE [DISTWIDTH] IN BLOOD BY AUTOMATED COUNT: 15.3 % (ref 11.5–17)
FERRITIN SERPL-MCNC: 4406.78 NG/ML (ref 21.81–274.66)
FOLATE SERPL-MCNC: 18.5 NG/ML (ref 7–31.4)
GFR SERPLBLD CREATININE-BSD FMLA CKD-EPI: >60 ML/MIN/1.73/M2
GLOBULIN SER-MCNC: 3.7 GM/DL (ref 2.4–3.5)
GLUCOSE SERPL-MCNC: 133 MG/DL (ref 74–100)
HCT VFR BLD AUTO: 31.4 % (ref 42–52)
HGB BLD-MCNC: 11.6 G/DL (ref 14–18)
LYMPHOCYTES NFR BLD MANUAL: 38 % (ref 13–40)
LYMPHOCYTES NFR BLD MANUAL: 4.77 X10(3)/MCL (ref 0.6–4.6)
MACROCYTES BLD QL SMEAR: ABNORMAL
MCH RBC QN AUTO: 50.4 PG (ref 27–31)
MCHC RBC AUTO-ENTMCNC: 36.9 G/DL (ref 33–36)
MCV RBC AUTO: 136.5 FL (ref 80–94)
MONOCYTES NFR BLD MANUAL: 1 X10(3)/MCL (ref 0.1–1.3)
MONOCYTES NFR BLD MANUAL: 8 % (ref 2–11)
NEUTROPHILS NFR BLD MANUAL: 54 % (ref 47–80)
NRBC BLD MANUAL-RTO: 240 %
PLATELET # BLD AUTO: 311 X10(3)/MCL (ref 130–400)
PLATELET # BLD EST: ADEQUATE 10*3/UL
PMV BLD AUTO: 10 FL (ref 7.4–10.4)
POTASSIUM SERPL-SCNC: 4 MMOL/L (ref 3.5–5.1)
PROT SERPL-MCNC: 7 GM/DL (ref 6.4–8.3)
RBC # BLD AUTO: 2.3 X10(6)/MCL (ref 4.7–6.1)
SODIUM SERPL-SCNC: 139 MMOL/L (ref 136–145)
WBC # BLD AUTO: 12.56 X10(3)/MCL (ref 4.5–11.5)

## 2025-05-08 PROCEDURE — 99215 OFFICE O/P EST HI 40 MIN: CPT | Mod: PBBFAC

## 2025-05-08 PROCEDURE — 85025 COMPLETE CBC W/AUTO DIFF WBC: CPT

## 2025-05-08 PROCEDURE — 80053 COMPREHEN METABOLIC PANEL: CPT

## 2025-05-08 PROCEDURE — 82728 ASSAY OF FERRITIN: CPT

## 2025-05-08 PROCEDURE — 82746 ASSAY OF FOLIC ACID SERUM: CPT

## 2025-05-08 PROCEDURE — 99999 PR PBB SHADOW E&M-EST. PATIENT-LVL V: CPT | Mod: PBBFAC,,,

## 2025-05-08 PROCEDURE — 36415 COLL VENOUS BLD VENIPUNCTURE: CPT

## 2025-05-08 RX ORDER — ERGOCALCIFEROL 1.25 MG/1
50000 CAPSULE ORAL
Qty: 30 CAPSULE | Refills: 2 | Status: SHIPPED | OUTPATIENT
Start: 2025-05-08

## 2025-05-08 RX ORDER — FOLIC ACID 1 MG/1
1 TABLET ORAL DAILY
Qty: 30 TABLET | Refills: 6 | Status: SHIPPED | OUTPATIENT
Start: 2025-05-08

## 2025-05-08 RX ORDER — HYDROXYUREA 500 MG/1
CAPSULE ORAL
Qty: 120 CAPSULE | Refills: 3 | Status: SHIPPED | OUTPATIENT
Start: 2025-05-08

## 2025-05-08 RX ORDER — DEFERIPRONE 1000 MG/1
2.5 TABLET ORAL 3 TIMES DAILY
Qty: 225 TABLET | Refills: 6 | Status: SHIPPED | OUTPATIENT
Start: 2025-05-08

## 2025-05-16 DIAGNOSIS — D57.00 SICKLE CELL DISEASE WITH CRISIS: ICD-10-CM

## 2025-05-16 DIAGNOSIS — M87.9 HIP PAIN WITH OSTEONECROSIS: ICD-10-CM

## 2025-05-16 RX ORDER — OXYCODONE AND ACETAMINOPHEN 10; 325 MG/1; MG/1
1 TABLET ORAL EVERY 6 HOURS PRN
Qty: 120 TABLET | Refills: 0 | Status: SHIPPED | OUTPATIENT
Start: 2025-05-16

## 2025-06-12 DIAGNOSIS — D57.00 SICKLE CELL DISEASE WITH CRISIS: ICD-10-CM

## 2025-06-12 DIAGNOSIS — M87.9 HIP PAIN WITH OSTEONECROSIS: ICD-10-CM

## 2025-06-12 RX ORDER — OXYCODONE AND ACETAMINOPHEN 10; 325 MG/1; MG/1
1 TABLET ORAL EVERY 6 HOURS PRN
Qty: 120 TABLET | Refills: 0 | Status: SHIPPED | OUTPATIENT
Start: 2025-06-12

## 2025-07-09 DIAGNOSIS — D57.00 SICKLE CELL DISEASE WITH CRISIS: ICD-10-CM

## 2025-07-09 DIAGNOSIS — M87.9 HIP PAIN WITH OSTEONECROSIS: ICD-10-CM

## 2025-07-09 RX ORDER — OXYCODONE AND ACETAMINOPHEN 10; 325 MG/1; MG/1
1 TABLET ORAL EVERY 4 HOURS PRN
Qty: 120 TABLET | Refills: 0 | Status: SHIPPED | OUTPATIENT
Start: 2025-07-09

## 2025-08-05 DIAGNOSIS — M87.9 HIP PAIN WITH OSTEONECROSIS: ICD-10-CM

## 2025-08-05 DIAGNOSIS — D57.00 SICKLE CELL DISEASE WITH CRISIS: ICD-10-CM

## 2025-08-05 RX ORDER — OXYCODONE AND ACETAMINOPHEN 10; 325 MG/1; MG/1
1 TABLET ORAL EVERY 4 HOURS PRN
Qty: 120 TABLET | Refills: 0 | Status: SHIPPED | OUTPATIENT
Start: 2025-08-05

## 2025-08-11 ENCOUNTER — DOCUMENTATION ONLY (OUTPATIENT)
Facility: CLINIC | Age: 40
End: 2025-08-11
Payer: MEDICARE

## 2025-08-11 ENCOUNTER — OFFICE VISIT (OUTPATIENT)
Facility: CLINIC | Age: 40
End: 2025-08-11
Payer: MEDICARE

## 2025-08-11 ENCOUNTER — LAB VISIT (OUTPATIENT)
Dept: LAB | Facility: HOSPITAL | Age: 40
End: 2025-08-11
Payer: MEDICARE

## 2025-08-11 VITALS
SYSTOLIC BLOOD PRESSURE: 139 MMHG | HEART RATE: 93 BPM | OXYGEN SATURATION: 95 % | BODY MASS INDEX: 33.86 KG/M2 | HEIGHT: 66 IN | RESPIRATION RATE: 20 BRPM | WEIGHT: 210.69 LBS | DIASTOLIC BLOOD PRESSURE: 92 MMHG | TEMPERATURE: 98 F

## 2025-08-11 DIAGNOSIS — M87.00 AVASCULAR NECROSIS: ICD-10-CM

## 2025-08-11 DIAGNOSIS — G89.29 OTHER CHRONIC PAIN: ICD-10-CM

## 2025-08-11 DIAGNOSIS — E55.9 VITAMIN D DEFICIENCY, UNSPECIFIED: ICD-10-CM

## 2025-08-11 DIAGNOSIS — E53.8 FOLIC ACID DEFICIENCY: ICD-10-CM

## 2025-08-11 DIAGNOSIS — E55.9 VITAMIN D DEFICIENCY: ICD-10-CM

## 2025-08-11 DIAGNOSIS — E83.19 IRON OVERLOAD: ICD-10-CM

## 2025-08-11 DIAGNOSIS — D57.00 SICKLE CELL DISEASE WITH CRISIS: Primary | ICD-10-CM

## 2025-08-11 DIAGNOSIS — D57.00 SICKLE CELL DISEASE WITH CRISIS: ICD-10-CM

## 2025-08-11 DIAGNOSIS — E87.6 HYPOKALEMIA: ICD-10-CM

## 2025-08-11 DIAGNOSIS — M87.059 AVASCULAR NECROSIS OF BONE OF HIP, UNSPECIFIED LATERALITY: ICD-10-CM

## 2025-08-11 DIAGNOSIS — Z79.64 ON HYDROXYUREA THERAPY: ICD-10-CM

## 2025-08-11 LAB
25(OH)D3+25(OH)D2 SERPL-MCNC: 33 NG/ML (ref 30–80)
ALBUMIN SERPL-MCNC: 3.1 G/DL (ref 3.5–5)
ALBUMIN/GLOB SERPL: 0.8 RATIO (ref 1.1–2)
ALP SERPL-CCNC: 67 UNIT/L (ref 40–150)
ALT SERPL-CCNC: 34 UNIT/L (ref 0–55)
ANION GAP SERPL CALC-SCNC: 6 MEQ/L
AST SERPL-CCNC: 30 UNIT/L (ref 11–45)
BASOPHILS # BLD AUTO: 0.06 X10(3)/MCL
BASOPHILS NFR BLD AUTO: 0.8 %
BILIRUB SERPL-MCNC: 1.5 MG/DL
BUN SERPL-MCNC: 9 MG/DL (ref 8.9–20.6)
CALCIUM SERPL-MCNC: 8.9 MG/DL (ref 8.4–10.2)
CHLORIDE SERPL-SCNC: 107 MMOL/L (ref 98–107)
CO2 SERPL-SCNC: 29 MMOL/L (ref 22–29)
CREAT SERPL-MCNC: 1.07 MG/DL (ref 0.72–1.25)
CREAT/UREA NIT SERPL: 8
EOSINOPHIL # BLD AUTO: 0.2 X10(3)/MCL (ref 0–0.9)
EOSINOPHIL NFR BLD AUTO: 2.7 %
ERYTHROCYTE [DISTWIDTH] IN BLOOD BY AUTOMATED COUNT: 15.3 % (ref 11.5–17)
FERRITIN SERPL-MCNC: 4742.99 NG/ML (ref 21.81–274.66)
FOLATE SERPL-MCNC: 18.5 NG/ML (ref 7–31.4)
GFR SERPLBLD CREATININE-BSD FMLA CKD-EPI: >60 ML/MIN/1.73/M2
GLOBULIN SER-MCNC: 3.8 GM/DL (ref 2.4–3.5)
GLUCOSE SERPL-MCNC: 97 MG/DL (ref 74–100)
HCT VFR BLD AUTO: 32.8 % (ref 42–52)
HGB BLD-MCNC: 12 G/DL (ref 14–18)
IMM GRANULOCYTES # BLD AUTO: 0.01 X10(3)/MCL (ref 0–0.04)
IMM GRANULOCYTES NFR BLD AUTO: 0.1 %
LYMPHOCYTES # BLD AUTO: 3.68 X10(3)/MCL (ref 0.6–4.6)
LYMPHOCYTES NFR BLD AUTO: 49.2 %
MACROCYTES BLD QL SMEAR: ABNORMAL
MCH RBC QN AUTO: 49.4 PG (ref 27–31)
MCHC RBC AUTO-ENTMCNC: 36.6 G/DL (ref 33–36)
MCV RBC AUTO: 135 FL (ref 80–94)
MONOCYTES # BLD AUTO: 0.85 X10(3)/MCL (ref 0.1–1.3)
MONOCYTES NFR BLD AUTO: 11.4 %
NEUTROPHILS # BLD AUTO: 2.68 X10(3)/MCL (ref 2.1–9.2)
NEUTROPHILS NFR BLD AUTO: 35.8 %
PLATELET # BLD AUTO: 340 X10(3)/MCL (ref 130–400)
PLATELET # BLD EST: ADEQUATE 10*3/UL
PMV BLD AUTO: 9.9 FL (ref 7.4–10.4)
POTASSIUM SERPL-SCNC: 3.3 MMOL/L (ref 3.5–5.1)
PROT SERPL-MCNC: 6.9 GM/DL (ref 6.4–8.3)
RBC # BLD AUTO: 2.43 X10(6)/MCL (ref 4.7–6.1)
RBC MORPH BLD: ABNORMAL
SODIUM SERPL-SCNC: 142 MMOL/L (ref 136–145)
WBC # BLD AUTO: 7.48 X10(3)/MCL (ref 4.5–11.5)

## 2025-08-11 PROCEDURE — 99999 PR PBB SHADOW E&M-EST. PATIENT-LVL V: CPT | Mod: PBBFAC,,,

## 2025-08-11 PROCEDURE — 99215 OFFICE O/P EST HI 40 MIN: CPT | Mod: PBBFAC

## 2025-08-11 PROCEDURE — 82728 ASSAY OF FERRITIN: CPT

## 2025-08-11 PROCEDURE — 82306 VITAMIN D 25 HYDROXY: CPT

## 2025-08-11 PROCEDURE — 82746 ASSAY OF FOLIC ACID SERUM: CPT

## 2025-08-11 PROCEDURE — 36415 COLL VENOUS BLD VENIPUNCTURE: CPT

## 2025-08-11 PROCEDURE — 85025 COMPLETE CBC W/AUTO DIFF WBC: CPT

## 2025-08-11 PROCEDURE — 80053 COMPREHEN METABOLIC PANEL: CPT

## 2025-08-11 RX ORDER — POTASSIUM CHLORIDE 20 MEQ/1
40 TABLET, EXTENDED RELEASE ORAL DAILY
Qty: 14 TABLET | Refills: 0 | Status: SHIPPED | OUTPATIENT
Start: 2025-08-11 | End: 2026-08-11

## 2025-09-02 DIAGNOSIS — M87.9 HIP PAIN WITH OSTEONECROSIS: ICD-10-CM

## 2025-09-02 DIAGNOSIS — D57.00 SICKLE CELL DISEASE WITH CRISIS: ICD-10-CM

## 2025-09-02 RX ORDER — OXYCODONE AND ACETAMINOPHEN 10; 325 MG/1; MG/1
1 TABLET ORAL EVERY 4 HOURS PRN
Qty: 120 TABLET | Refills: 0 | Status: SHIPPED | OUTPATIENT
Start: 2025-09-02